# Patient Record
Sex: MALE | Race: WHITE | NOT HISPANIC OR LATINO | URBAN - METROPOLITAN AREA
[De-identification: names, ages, dates, MRNs, and addresses within clinical notes are randomized per-mention and may not be internally consistent; named-entity substitution may affect disease eponyms.]

---

## 2023-06-15 DIAGNOSIS — F41.9 ANXIETY: Primary | ICD-10-CM

## 2023-06-15 RX ORDER — ESCITALOPRAM OXALATE 10 MG/1
TABLET ORAL
COMMUNITY
Start: 2016-03-17 | End: 2023-06-15 | Stop reason: SDUPTHER

## 2023-06-15 RX ORDER — FAMOTIDINE 20 MG/1
1 TABLET, FILM COATED ORAL DAILY
COMMUNITY

## 2023-06-15 RX ORDER — LEVOTHYROXINE SODIUM 112 UG/1
1 TABLET ORAL DAILY
COMMUNITY
Start: 2014-02-06 | End: 2023-07-24 | Stop reason: SDUPTHER

## 2023-06-15 RX ORDER — ESCITALOPRAM OXALATE 10 MG/1
10 TABLET ORAL DAILY
Qty: 90 TABLET | Refills: 0 | Status: SHIPPED | OUTPATIENT
Start: 2023-06-15 | End: 2023-09-11 | Stop reason: SDUPTHER

## 2023-06-15 NOTE — TELEPHONE ENCOUNTER
REFILL REQUEST    Med: Escitalopram Oxalate  Med Dose: 10 mg  Med Frequency: one tab daily    Pharmacy: [facility]    LR: 12/29/2022  LV: 11/07/2022  NV: 11/09/2023

## 2023-06-20 ENCOUNTER — OFFICE VISIT (OUTPATIENT)
Dept: PRIMARY CARE | Facility: CLINIC | Age: 63
End: 2023-06-20
Payer: COMMERCIAL

## 2023-06-20 VITALS
TEMPERATURE: 98.3 F | BODY MASS INDEX: 24.33 KG/M2 | DIASTOLIC BLOOD PRESSURE: 68 MMHG | SYSTOLIC BLOOD PRESSURE: 120 MMHG | WEIGHT: 172 LBS

## 2023-06-20 DIAGNOSIS — M47.816 SPONDYLOSIS OF LUMBAR REGION WITHOUT MYELOPATHY OR RADICULOPATHY: Primary | ICD-10-CM

## 2023-06-20 PROCEDURE — 1036F TOBACCO NON-USER: CPT | Performed by: FAMILY MEDICINE

## 2023-06-20 PROCEDURE — 99213 OFFICE O/P EST LOW 20 MIN: CPT | Performed by: FAMILY MEDICINE

## 2023-06-20 RX ORDER — METHYLPREDNISOLONE 4 MG/1
TABLET ORAL
Qty: 21 TABLET | Refills: 0 | Status: SHIPPED | OUTPATIENT
Start: 2023-06-20 | End: 2023-06-27

## 2023-06-20 RX ORDER — TRAMADOL HYDROCHLORIDE 50 MG/1
50 TABLET ORAL EVERY 6 HOURS PRN
Qty: 15 TABLET | Refills: 0 | Status: SHIPPED | OUTPATIENT
Start: 2023-06-20 | End: 2023-06-25

## 2023-06-20 ASSESSMENT — PATIENT HEALTH QUESTIONNAIRE - PHQ9
2. FEELING DOWN, DEPRESSED OR HOPELESS: NOT AT ALL
SUM OF ALL RESPONSES TO PHQ9 QUESTIONS 1 AND 2: 0
1. LITTLE INTEREST OR PLEASURE IN DOING THINGS: NOT AT ALL

## 2023-06-20 ASSESSMENT — ENCOUNTER SYMPTOMS: DEPRESSION: 0

## 2023-06-20 NOTE — PATIENT INSTRUCTIONS
Continue with hanging (right side up) traction. Continue with the TENS unit. I will order a course of steroids and a short course of tramadol, which is potentially habit forming and addictive.  Return in two weeks if this is not much better.  PT will be a consideration.

## 2023-06-20 NOTE — PROGRESS NOTES
Subjective   Patient ID: [id number]     Asher Bernabe is a 63 y.o. male who presents for Back Pain (Lower back).  HPI  He complains of back pain.      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  He had some pain with that and was hoping it would go away with rest.  It did improve.  The pain came back in March.      He was diagnosed with a retinal tear and was treated with retinal surgery 4/27/23 with Dr Candelaria.  Had two follow ups and everything was good. Has another follow up in October.      He had PT for his back five times in November and it seemed to help.      The pain now is daily.  It started in March or April.  He may have strained it mowing the lawn.  He takes ibuprofen daily.  He hangs from a chin up bar.  He uses a TENS unit that helps. He uses it for a half hour per night.      Used leftover tramadol from years ago four times and that helped him sleep.     He had an xray of the lumbar spine 2012.  It showed L5-S1 DDD.      Checked OARRS.  No scripts in system  Objective     /68 (BP Location: Left arm, Patient Position: Sitting)   Temp 36.8 °C (98.3 °F)   Wt 78 kg (172 lb)   BMI 24.33 kg/m²      Physical Exam  Constitutional:       Appearance: Normal appearance.   Musculoskeletal:         General: Tenderness (tender at the lumbar paraspinals.) present.      Comments: Tender at the L5 midline    Neurological:      Mental Status: He is alert.      Sensory: No sensory deficit.      Comments: SLR neg.  DTR neg.         Assessment/Plan   Problem List Items Addressed This Visit    None  Visit Diagnoses       Spondylosis of lumbar region without myelopathy or radiculopathy    -  Primary    Relevant Medications    traMADol (Ultram) 50 mg tablet    methylPREDNISolone (Medrol Dospak) 4 mg tablets        Continue with hanging (right side up) traction. Continue with the TENS unit. I will order a course of steroids and a short course of tramadol, which is potentially habit forming  and addictive.  Return in two weeks if this is not much better.  PT will be a consideration.    Tyree Box, DO

## 2023-07-24 DIAGNOSIS — E03.9 ACQUIRED HYPOTHYROIDISM: Primary | ICD-10-CM

## 2023-07-24 RX ORDER — LEVOTHYROXINE SODIUM 112 UG/1
112 TABLET ORAL DAILY
Qty: 90 TABLET | Refills: 0 | Status: SHIPPED | OUTPATIENT
Start: 2023-07-24 | End: 2023-09-11 | Stop reason: SDUPTHER

## 2023-07-24 NOTE — TELEPHONE ENCOUNTER
Pt is requesting a refill on    Levothyroxine 112 mg 1 po every day  LR 12/29/2022  # 90 1 [facility]  [phone]    [place]   11/19/2023

## 2023-08-22 ENCOUNTER — OFFICE VISIT (OUTPATIENT)
Dept: PRIMARY CARE | Facility: CLINIC | Age: 63
End: 2023-08-22
Payer: COMMERCIAL

## 2023-08-22 VITALS
SYSTOLIC BLOOD PRESSURE: 122 MMHG | TEMPERATURE: 97.9 F | BODY MASS INDEX: 24.47 KG/M2 | WEIGHT: 173 LBS | DIASTOLIC BLOOD PRESSURE: 70 MMHG

## 2023-08-22 DIAGNOSIS — M54.50 CHRONIC BILATERAL LOW BACK PAIN WITHOUT SCIATICA: Primary | ICD-10-CM

## 2023-08-22 DIAGNOSIS — G89.29 CHRONIC BILATERAL LOW BACK PAIN WITHOUT SCIATICA: Primary | ICD-10-CM

## 2023-08-22 DIAGNOSIS — M47.816 SPONDYLOSIS OF LUMBAR REGION WITHOUT MYELOPATHY OR RADICULOPATHY: ICD-10-CM

## 2023-08-22 DIAGNOSIS — M54.2 NECK PAIN: ICD-10-CM

## 2023-08-22 PROBLEM — J06.9 ACUTE URI: Status: RESOLVED | Noted: 2023-08-22 | Resolved: 2023-08-22

## 2023-08-22 PROBLEM — S80.819A LEG ABRASION: Status: RESOLVED | Noted: 2023-08-22 | Resolved: 2023-08-22

## 2023-08-22 PROBLEM — J20.9 ACUTE BRONCHITIS WITH BRONCHOSPASM: Status: RESOLVED | Noted: 2023-08-22 | Resolved: 2023-08-22

## 2023-08-22 PROBLEM — M25.519 SHOULDER PAIN, ACUTE: Status: ACTIVE | Noted: 2023-08-22

## 2023-08-22 PROBLEM — R09.81 SINUS CONGESTION: Status: RESOLVED | Noted: 2023-08-22 | Resolved: 2023-08-22

## 2023-08-22 PROBLEM — R22.0 LIP SWELLING: Status: RESOLVED | Noted: 2023-08-22 | Resolved: 2023-08-22

## 2023-08-22 PROBLEM — J30.9 ALLERGIC RHINITIS: Status: ACTIVE | Noted: 2023-08-22

## 2023-08-22 PROBLEM — E03.9 HYPOTHYROIDISM: Status: ACTIVE | Noted: 2023-08-22

## 2023-08-22 PROBLEM — J02.9 ACUTE PHARYNGITIS: Status: RESOLVED | Noted: 2023-08-22 | Resolved: 2023-08-22

## 2023-08-22 PROBLEM — F41.9 ANXIETY: Status: ACTIVE | Noted: 2023-08-22

## 2023-08-22 PROBLEM — S46.912A LEFT SHOULDER STRAIN, INITIAL ENCOUNTER: Status: ACTIVE | Noted: 2023-08-22

## 2023-08-22 PROBLEM — E87.5 HIGH SERUM POTASSIUM LEVEL: Status: ACTIVE | Noted: 2023-08-22

## 2023-08-22 PROBLEM — S39.012A STRAIN OF LUMBAR REGION: Status: ACTIVE | Noted: 2023-08-22

## 2023-08-22 PROBLEM — J01.90 ACUTE SINUSITIS: Status: RESOLVED | Noted: 2023-08-22 | Resolved: 2023-08-22

## 2023-08-22 PROCEDURE — 99213 OFFICE O/P EST LOW 20 MIN: CPT | Performed by: FAMILY MEDICINE

## 2023-08-22 PROCEDURE — 1036F TOBACCO NON-USER: CPT | Performed by: FAMILY MEDICINE

## 2023-08-22 RX ORDER — METHYLPREDNISOLONE 4 MG/1
TABLET ORAL
Qty: 21 TABLET | Refills: 0 | Status: SHIPPED | OUTPATIENT
Start: 2023-08-22 | End: 2023-08-29

## 2023-08-22 ASSESSMENT — PATIENT HEALTH QUESTIONNAIRE - PHQ9
SUM OF ALL RESPONSES TO PHQ9 QUESTIONS 1 AND 2: 0
1. LITTLE INTEREST OR PLEASURE IN DOING THINGS: NOT AT ALL
2. FEELING DOWN, DEPRESSED OR HOPELESS: NOT AT ALL

## 2023-08-22 ASSESSMENT — ENCOUNTER SYMPTOMS: DEPRESSION: 0

## 2023-08-22 NOTE — PROGRESS NOTES
"Subjective   Patient ID: [id number]     Asher Bernabe is a 63 y.o. male who presents for Back Pain (lower) and Tingling (In neck.).  HPI  He complains of lower back pain and tingling in the neck.      He went on a bus trip to [facility] in late June.  He then came back and used the TENS unit.      He can be feeling just fine until he bends down to  sticks in the yard.      He gets tingling in the neck if he puts his hand on the right side of the neck.      Goes walking and that helps a lot but then he picks up a few sticks and the pain comes back.  The pain is really bad with picking up sticks.      He watches his grandson twice a week.  He has \"weaned off of picking him up\".        Objective     /70 (BP Location: Right arm, Patient Position: Sitting)   Temp 36.6 °C (97.9 °F)   Wt 78.5 kg (173 lb)   BMI 24.47 kg/m²      Physical Exam  Constitutional:       Appearance: Normal appearance.   Musculoskeletal:      Comments: Tender at the lumbar paraspinals.  Tender at the mid L5 region focally.         Neurological:      Mental Status: He is alert.         Assessment/Plan   Problem List Items Addressed This Visit    None  Visit Diagnoses       Chronic bilateral low back pain without sciatica    -  Primary    Relevant Medications    methylPREDNISolone (Medrol Dospak) 4 mg tablets    Other Relevant Orders    XR lumbar spine 2-3 views    Referral to Physical Therapy    Spondylosis of lumbar region without myelopathy or radiculopathy        Relevant Medications    methylPREDNISolone (Medrol Dospak) 4 mg tablets    Other Relevant Orders    XR lumbar spine 2-3 views    Referral to Physical Therapy    Neck pain        Relevant Medications    methylPREDNISolone (Medrol Dospak) 4 mg tablets    Other Relevant Orders    XR cervical spine 2-3 views    Referral to Physical Therapy        I will order steroids, xrays and physical therapy.  Return in one month if this persists.    Tyree Box, DO   "

## 2023-09-11 DIAGNOSIS — F41.9 ANXIETY: ICD-10-CM

## 2023-09-11 DIAGNOSIS — E03.9 ACQUIRED HYPOTHYROIDISM: ICD-10-CM

## 2023-09-11 RX ORDER — ESCITALOPRAM OXALATE 10 MG/1
10 TABLET ORAL DAILY
Qty: 90 TABLET | Refills: 0 | Status: SHIPPED | OUTPATIENT
Start: 2023-09-11 | End: 2023-11-13 | Stop reason: SDUPTHER

## 2023-09-11 RX ORDER — LEVOTHYROXINE SODIUM 112 UG/1
112 TABLET ORAL DAILY
Qty: 90 TABLET | Refills: 0 | Status: SHIPPED | OUTPATIENT
Start: 2023-09-11 | End: 2023-10-19 | Stop reason: SDUPTHER

## 2023-09-11 NOTE — TELEPHONE ENCOUNTER
Pt requesting refill- Pt is requesting a 90 day supply with 3 refills    Escitalopram 10mg daily- Last refill 6/15/23  Levothyroxine 112mcg daily- Last refill 7/24/23  [facility]  Last appt 8/22/23  Next appt 11/13/23

## 2023-10-18 DIAGNOSIS — E03.9 ACQUIRED HYPOTHYROIDISM: ICD-10-CM

## 2023-10-18 NOTE — TELEPHONE ENCOUNTER
Refill Synthroid 112mcg 1 every day    Pharmacy: [facility]    LR: 09/11/23  LV: 08/22/23  NV: 11/13/23    Can wait

## 2023-10-19 RX ORDER — LEVOTHYROXINE SODIUM 112 UG/1
112 TABLET ORAL DAILY
Qty: 90 TABLET | Refills: 0 | Status: SHIPPED | OUTPATIENT
Start: 2023-10-19 | End: 2023-11-13 | Stop reason: SDUPTHER

## 2023-11-03 ENCOUNTER — TELEMEDICINE (OUTPATIENT)
Dept: PRIMARY CARE | Facility: CLINIC | Age: 63
End: 2023-11-03
Payer: COMMERCIAL

## 2023-11-03 DIAGNOSIS — B34.2 CORONAVIRUS INFECTION: ICD-10-CM

## 2023-11-03 DIAGNOSIS — J01.00 ACUTE NON-RECURRENT MAXILLARY SINUSITIS: Primary | ICD-10-CM

## 2023-11-03 PROCEDURE — 99213 OFFICE O/P EST LOW 20 MIN: CPT | Performed by: FAMILY MEDICINE

## 2023-11-03 RX ORDER — AMOXICILLIN AND CLAVULANATE POTASSIUM 875; 125 MG/1; MG/1
875 TABLET, FILM COATED ORAL 2 TIMES DAILY
Qty: 20 TABLET | Refills: 0 | Status: SHIPPED | OUTPATIENT
Start: 2023-11-03 | End: 2023-11-13

## 2023-11-03 NOTE — PROGRESS NOTES
Subjective   Patient ID: [id number]   Virtual or Telephone Consent    An interactive audio and video telecommunication system which permits real time communications between the patient (at the originating site) and provider (at the distant site) was utilized to provide this telehealth service.   Verbal consent was requested and obtained from Asher Bernabe on this date, 11/03/23 for a telehealth visit.     Asher Bernabe is a 63 y.o. male who presents for covid infection.    He started with sickness on 10/21.  He felt sick but then felt better.  He went back to work on 10/31 because he was feeling better.  He then got sick again and he tested positive today for covid.      He was feeling better for two days but then he got sick again on 11/1/23.      Maybe it was two separate illnesses but he is not sure.      No SOB.  He is fatigued.  Coughing.  Sinus congestion.    HPI    Objective     There were no vitals taken for this visit.     Physical Exam  Constitutional:       Appearance: Normal appearance.   HENT:      Nose:      Comments: Right maxillary tenderness. (Pt points)  Pulmonary:      Effort: Pulmonary effort is normal. No respiratory distress.   Neurological:      General: No focal deficit present.      Mental Status: He is alert.         Assessment/Plan   Problem List Items Addressed This Visit    None  Visit Diagnoses       Acute non-recurrent maxillary sinusitis    -  Primary    Relevant Medications    amoxicillin-pot clavulanate (Augmentin) 875-125 mg tablet    Coronavirus infection              I will prescribe antibiotics.  I recommend that you quarantine for two more days and until you are feeling significantly better.  I prescribed antibiotics for a possible sinus infection.    Tyree Box, DO

## 2023-11-03 NOTE — PATIENT INSTRUCTIONS
I will prescribe antibiotics.  I recommend that you quarantine for two more days and until you are feeling significantly better.  I prescribed antibiotics for a possible sinus infection.

## 2023-11-09 ENCOUNTER — APPOINTMENT (OUTPATIENT)
Dept: PRIMARY CARE | Facility: CLINIC | Age: 63
End: 2023-11-09
Payer: COMMERCIAL

## 2023-11-13 ENCOUNTER — OFFICE VISIT (OUTPATIENT)
Dept: PRIMARY CARE | Facility: CLINIC | Age: 63
End: 2023-11-13
Payer: COMMERCIAL

## 2023-11-13 ENCOUNTER — TELEPHONE (OUTPATIENT)
Dept: PRIMARY CARE | Facility: CLINIC | Age: 63
End: 2023-11-13

## 2023-11-13 VITALS
BODY MASS INDEX: 23.8 KG/M2 | SYSTOLIC BLOOD PRESSURE: 118 MMHG | HEIGHT: 71 IN | TEMPERATURE: 96.7 F | WEIGHT: 170 LBS | DIASTOLIC BLOOD PRESSURE: 70 MMHG

## 2023-11-13 DIAGNOSIS — Z23 NEED FOR VACCINATION: ICD-10-CM

## 2023-11-13 DIAGNOSIS — F41.9 ANXIETY: ICD-10-CM

## 2023-11-13 DIAGNOSIS — E03.9 ACQUIRED HYPOTHYROIDISM: ICD-10-CM

## 2023-11-13 DIAGNOSIS — Z00.00 GENERAL MEDICAL EXAM: Primary | ICD-10-CM

## 2023-11-13 LAB
ALBUMIN SERPL BCP-MCNC: 4.2 G/DL (ref 3.4–5)
ALP SERPL-CCNC: 74 U/L (ref 33–136)
ALT SERPL W P-5'-P-CCNC: 24 U/L (ref 10–52)
ANION GAP SERPL CALC-SCNC: 7 MMOL/L (ref 10–20)
APPEARANCE UR: CLEAR
AST SERPL W P-5'-P-CCNC: 31 U/L (ref 9–39)
BASOPHILS # BLD AUTO: 0.04 X10*3/UL (ref 0–0.1)
BASOPHILS NFR BLD AUTO: 0.8 %
BILIRUB SERPL-MCNC: 1.2 MG/DL (ref 0–1.2)
BILIRUB UR STRIP.AUTO-MCNC: NEGATIVE MG/DL
BUN SERPL-MCNC: 14 MG/DL (ref 6–23)
CALCIUM SERPL-MCNC: 9.4 MG/DL (ref 8.6–10.3)
CHLORIDE SERPL-SCNC: 101 MMOL/L (ref 98–107)
CHOLEST SERPL-MCNC: 174 MG/DL (ref 0–199)
CHOLESTEROL/HDL RATIO: 2.3
CO2 SERPL-SCNC: 34 MMOL/L (ref 21–32)
COLOR UR: YELLOW
CREAT SERPL-MCNC: 0.99 MG/DL (ref 0.5–1.3)
EOSINOPHIL # BLD AUTO: 0.19 X10*3/UL (ref 0–0.7)
EOSINOPHIL NFR BLD AUTO: 3.8 %
ERYTHROCYTE [DISTWIDTH] IN BLOOD BY AUTOMATED COUNT: 13.1 % (ref 11.5–14.5)
EST. AVERAGE GLUCOSE BLD GHB EST-MCNC: 111 MG/DL
GFR SERPL CREATININE-BSD FRML MDRD: 86 ML/MIN/1.73M*2
GLUCOSE SERPL-MCNC: 78 MG/DL (ref 74–99)
GLUCOSE UR STRIP.AUTO-MCNC: NEGATIVE MG/DL
HBA1C MFR BLD: 5.5 %
HCT VFR BLD AUTO: 45.3 % (ref 41–52)
HDLC SERPL-MCNC: 75.4 MG/DL
HGB BLD-MCNC: 14.7 G/DL (ref 13.5–17.5)
IMM GRANULOCYTES # BLD AUTO: 0.01 X10*3/UL (ref 0–0.7)
IMM GRANULOCYTES NFR BLD AUTO: 0.2 % (ref 0–0.9)
KETONES UR STRIP.AUTO-MCNC: NEGATIVE MG/DL
LDLC SERPL CALC-MCNC: 86 MG/DL
LEUKOCYTE ESTERASE UR QL STRIP.AUTO: NEGATIVE
LYMPHOCYTES # BLD AUTO: 1.12 X10*3/UL (ref 1.2–4.8)
LYMPHOCYTES NFR BLD AUTO: 22.2 %
MCH RBC QN AUTO: 32.5 PG (ref 26–34)
MCHC RBC AUTO-ENTMCNC: 32.5 G/DL (ref 32–36)
MCV RBC AUTO: 100 FL (ref 80–100)
MONOCYTES # BLD AUTO: 0.32 X10*3/UL (ref 0.1–1)
MONOCYTES NFR BLD AUTO: 6.3 %
NEUTROPHILS # BLD AUTO: 3.37 X10*3/UL (ref 1.2–7.7)
NEUTROPHILS NFR BLD AUTO: 66.7 %
NITRITE UR QL STRIP.AUTO: NEGATIVE
NON HDL CHOLESTEROL: 99 MG/DL (ref 0–149)
NRBC BLD-RTO: 0 /100 WBCS (ref 0–0)
PH UR STRIP.AUTO: 6 [PH]
PLATELET # BLD AUTO: 298 X10*3/UL (ref 150–450)
POTASSIUM SERPL-SCNC: 4.1 MMOL/L (ref 3.5–5.3)
PROT SERPL-MCNC: 6.9 G/DL (ref 6.4–8.2)
PROT UR STRIP.AUTO-MCNC: NEGATIVE MG/DL
PSA SERPL-MCNC: 0.48 NG/ML
RBC # BLD AUTO: 4.52 X10*6/UL (ref 4.5–5.9)
RBC # UR STRIP.AUTO: ABNORMAL /UL
RBC #/AREA URNS AUTO: NORMAL /HPF
SODIUM SERPL-SCNC: 138 MMOL/L (ref 136–145)
SP GR UR STRIP.AUTO: 1.02
TRIGL SERPL-MCNC: 62 MG/DL (ref 0–149)
TSH SERPL-ACNC: 1.33 MIU/L (ref 0.44–3.98)
UROBILINOGEN UR STRIP.AUTO-MCNC: <2 MG/DL
VLDL: 12 MG/DL (ref 0–40)
WBC # BLD AUTO: 5.1 X10*3/UL (ref 4.4–11.3)
WBC #/AREA URNS AUTO: NORMAL /HPF

## 2023-11-13 PROCEDURE — 85025 COMPLETE CBC W/AUTO DIFF WBC: CPT

## 2023-11-13 PROCEDURE — 81001 URINALYSIS AUTO W/SCOPE: CPT

## 2023-11-13 PROCEDURE — 99396 PREV VISIT EST AGE 40-64: CPT | Performed by: FAMILY MEDICINE

## 2023-11-13 PROCEDURE — 80053 COMPREHEN METABOLIC PANEL: CPT

## 2023-11-13 PROCEDURE — 84153 ASSAY OF PSA TOTAL: CPT

## 2023-11-13 PROCEDURE — 84443 ASSAY THYROID STIM HORMONE: CPT

## 2023-11-13 PROCEDURE — 36415 COLL VENOUS BLD VENIPUNCTURE: CPT

## 2023-11-13 PROCEDURE — 83036 HEMOGLOBIN GLYCOSYLATED A1C: CPT

## 2023-11-13 PROCEDURE — 90471 IMMUNIZATION ADMIN: CPT | Performed by: FAMILY MEDICINE

## 2023-11-13 PROCEDURE — 80061 LIPID PANEL: CPT

## 2023-11-13 PROCEDURE — 1036F TOBACCO NON-USER: CPT | Performed by: FAMILY MEDICINE

## 2023-11-13 PROCEDURE — 90686 IIV4 VACC NO PRSV 0.5 ML IM: CPT | Performed by: FAMILY MEDICINE

## 2023-11-13 RX ORDER — LEVOTHYROXINE SODIUM 112 UG/1
112 TABLET ORAL DAILY
Qty: 90 TABLET | Refills: 3 | Status: SHIPPED | OUTPATIENT
Start: 2023-11-13

## 2023-11-13 RX ORDER — ESCITALOPRAM OXALATE 10 MG/1
10 TABLET ORAL DAILY
Qty: 90 TABLET | Refills: 3 | Status: SHIPPED | OUTPATIENT
Start: 2023-11-13

## 2023-11-13 ASSESSMENT — ENCOUNTER SYMPTOMS
ABDOMINAL PAIN: 0
SHORTNESS OF BREATH: 0
WEAKNESS: 0
SLEEP DISTURBANCE: 0
DEPRESSION: 0
VOMITING: 0
HEADACHES: 0
BLOOD IN STOOL: 0
DYSPHORIC MOOD: 0
NAUSEA: 0
HEMATURIA: 0
PALPITATIONS: 0
SINUS PAIN: 0
COUGH: 0
DIZZINESS: 0
MYALGIAS: 0
DIARRHEA: 0
FATIGUE: 0
VOICE CHANGE: 0
NERVOUS/ANXIOUS: 0
FREQUENCY: 0
ADENOPATHY: 0
SORE THROAT: 0
WHEEZING: 0
NECK PAIN: 0
CHILLS: 0
RHINORRHEA: 0
ARTHRALGIAS: 0
DYSURIA: 0
TROUBLE SWALLOWING: 0
WOUND: 0
CONSTIPATION: 0
FEVER: 0
NUMBNESS: 0
BACK PAIN: 0

## 2023-11-13 ASSESSMENT — PATIENT HEALTH QUESTIONNAIRE - PHQ9
1. LITTLE INTEREST OR PLEASURE IN DOING THINGS: NOT AT ALL
SUM OF ALL RESPONSES TO PHQ9 QUESTIONS 1 AND 2: 0
2. FEELING DOWN, DEPRESSED OR HOPELESS: NOT AT ALL

## 2023-11-13 NOTE — PATIENT INSTRUCTIONS
Your exam was normal today.  Your blood pressure and weight are normal.  Continue to maintain a healthy diet and increase exercise. Return in one year for a general medical exam and sooner if you have any problems.  Your escitalopram and levothyroxine were refilled today.  A flu shot was given today.

## 2023-11-13 NOTE — TELEPHONE ENCOUNTER
Pt was just seen for an appointment today 11-13-23 and forgot to ask about his x-ray results for his neck and back. Pt is asking if someone can give him a call to discuss his results?

## 2023-11-13 NOTE — TELEPHONE ENCOUNTER
Per BH- Please let him know they showed degenerative disc disease and arthritis in the spine. It also showed evidence of muscle spasm. Physical therapy may be indicated if this is continuing to bother him.

## 2023-11-13 NOTE — PROGRESS NOTES
"Subjective   Patient ID: [id number]     Asher Bernabe is a 63 y.o. male who presents for Annual Exam (Fasting), Med Refill, and Flu Vaccine.  HPI  He is here for a general medical examination.    He had a colonoscopy in 2014 which was normal.  He was told to repeat it in five years. He states he had one in 2019 which was normal.  He was told to repeat every five years due to family history in brother and father.    He had a retinal tear treated in April.    No hospitalizations.  No surgeries.    He tested positive for covid on 11/3/23.  He is feeling better now.  Still wearing a mask.    He has had some neck pain.  He had PT which has helped. It is gone over time.    Never a smoker.  Drinks about five drinks per week.  No  drug use.      He used to exercise more and he wants to do more.  Signing up for volleyball.    He maintains a healthy diet.     He sees a dentist regularly.    Review of Systems   Constitutional:  Negative for chills, fatigue and fever.   HENT:  Negative for congestion, rhinorrhea, sinus pain, sore throat, trouble swallowing and voice change.    Respiratory:  Negative for cough, shortness of breath and wheezing.    Cardiovascular:  Negative for chest pain, palpitations and leg swelling.   Gastrointestinal:  Negative for abdominal pain, blood in stool, constipation, diarrhea, nausea and vomiting.   Genitourinary:  Negative for dysuria, frequency and hematuria.   Musculoskeletal:  Negative for arthralgias, back pain, myalgias and neck pain.        Neck pain is better    Skin:  Negative for rash and wound.        No moles growing or changing. \"Same red dots on my chest\".     Neurological:  Negative for dizziness, syncope, weakness, numbness and headaches.   Hematological:  Negative for adenopathy.   Psychiatric/Behavioral:  Negative for dysphoric mood, self-injury, sleep disturbance and suicidal ideas. The patient is not nervous/anxious.         Lexapro helps with mind racing and sleep problems.   " "        Objective     /70 (BP Location: Left arm, Patient Position: Sitting)   Temp 35.9 °C (96.7 °F) (Skin)   Ht 1.791 m (5' 10.5\")   Wt 77.1 kg (170 lb)   BMI 24.05 kg/m²      Physical Exam  Vitals reviewed.   Constitutional:       General: He is not in acute distress.     Appearance: Normal appearance. He is not ill-appearing or toxic-appearing.   HENT:      Head: Normocephalic and atraumatic.      Right Ear: Tympanic membrane, ear canal and external ear normal.      Left Ear: Tympanic membrane, ear canal and external ear normal.      Nose: Nose normal.      Mouth/Throat:      Mouth: Mucous membranes are moist.   Eyes:      Extraocular Movements: Extraocular movements intact.      Conjunctiva/sclera: Conjunctivae normal.      Pupils: Pupils are equal, round, and reactive to light.   Cardiovascular:      Rate and Rhythm: Normal rate and regular rhythm.      Heart sounds: No murmur heard.  Pulmonary:      Effort: Pulmonary effort is normal.      Breath sounds: Normal breath sounds.   Abdominal:      General: Bowel sounds are normal. There is no distension.      Palpations: Abdomen is soft. There is no mass.      Tenderness: There is no abdominal tenderness. There is no guarding or rebound.   Musculoskeletal:         General: No tenderness.      Cervical back: Neck supple.      Right lower leg: No edema.      Left lower leg: No edema.   Skin:     Coloration: Skin is not jaundiced or pale.      Findings: No rash.   Neurological:      General: No focal deficit present.      Mental Status: He is alert and oriented to person, place, and time. Mental status is at baseline.   Psychiatric:         Mood and Affect: Mood normal.         Behavior: Behavior normal.         Thought Content: Thought content normal.         Judgment: Judgment normal.         Assessment/Plan   Problem List Items Addressed This Visit       Anxiety    Relevant Medications    escitalopram (Lexapro) 10 mg tablet    Hypothyroidism    Relevant " Medications    levothyroxine (Synthroid, Levoxyl) 112 mcg tablet     Other Visit Diagnoses       General medical exam    -  Primary    Relevant Orders    Urinalysis with Reflex Microscopic    Thyroid Stimulating Hormone    Hemoglobin A1C    Lipid Panel    CBC and Auto Differential    Comprehensive Metabolic Panel    Prostate Specific Antigen    Need for vaccination            Your exam was normal today.  Your blood pressure and weight are normal.  Continue to maintain a healthy diet and increase exercise. Return in one year for a general medical exam and sooner if you have any problems.  Your escitalopram and levothyroxine were refilled today.  A flu shot was given today.      Tyree Box, DO

## 2024-07-30 ENCOUNTER — APPOINTMENT (OUTPATIENT)
Dept: PRIMARY CARE | Facility: CLINIC | Age: 64
End: 2024-07-30
Payer: COMMERCIAL

## 2024-07-30 VITALS
WEIGHT: 171 LBS | TEMPERATURE: 97.8 F | SYSTOLIC BLOOD PRESSURE: 122 MMHG | BODY MASS INDEX: 24.19 KG/M2 | DIASTOLIC BLOOD PRESSURE: 70 MMHG

## 2024-07-30 DIAGNOSIS — E03.9 HYPOTHYROIDISM, UNSPECIFIED TYPE: Primary | ICD-10-CM

## 2024-07-30 DIAGNOSIS — R53.83 FATIGUE, UNSPECIFIED TYPE: ICD-10-CM

## 2024-07-30 LAB
ALBUMIN SERPL BCP-MCNC: 4.6 G/DL (ref 3.4–5)
ALP SERPL-CCNC: 77 U/L (ref 33–136)
ALT SERPL W P-5'-P-CCNC: 20 U/L (ref 10–52)
ANION GAP SERPL CALC-SCNC: 12 MMOL/L (ref 10–20)
AST SERPL W P-5'-P-CCNC: 27 U/L (ref 9–39)
BASOPHILS # BLD AUTO: 0.05 X10*3/UL (ref 0–0.1)
BASOPHILS NFR BLD AUTO: 0.8 %
BILIRUB SERPL-MCNC: 1.6 MG/DL (ref 0–1.2)
BUN SERPL-MCNC: 13 MG/DL (ref 6–23)
CALCIUM SERPL-MCNC: 9.7 MG/DL (ref 8.6–10.3)
CHLORIDE SERPL-SCNC: 103 MMOL/L (ref 98–107)
CO2 SERPL-SCNC: 30 MMOL/L (ref 21–32)
CREAT SERPL-MCNC: 1.07 MG/DL (ref 0.5–1.3)
EGFRCR SERPLBLD CKD-EPI 2021: 78 ML/MIN/1.73M*2
EOSINOPHIL # BLD AUTO: 0.27 X10*3/UL (ref 0–0.7)
EOSINOPHIL NFR BLD AUTO: 4.2 %
ERYTHROCYTE [DISTWIDTH] IN BLOOD BY AUTOMATED COUNT: 13.3 % (ref 11.5–14.5)
GLUCOSE SERPL-MCNC: 76 MG/DL (ref 74–99)
HCT VFR BLD AUTO: 47.6 % (ref 41–52)
HGB BLD-MCNC: 15.4 G/DL (ref 13.5–17.5)
IMM GRANULOCYTES # BLD AUTO: 0.02 X10*3/UL (ref 0–0.7)
IMM GRANULOCYTES NFR BLD AUTO: 0.3 % (ref 0–0.9)
LYMPHOCYTES # BLD AUTO: 1.61 X10*3/UL (ref 1.2–4.8)
LYMPHOCYTES NFR BLD AUTO: 25.1 %
MCH RBC QN AUTO: 32 PG (ref 26–34)
MCHC RBC AUTO-ENTMCNC: 32.4 G/DL (ref 32–36)
MCV RBC AUTO: 99 FL (ref 80–100)
MONOCYTES # BLD AUTO: 0.5 X10*3/UL (ref 0.1–1)
MONOCYTES NFR BLD AUTO: 7.8 %
NEUTROPHILS # BLD AUTO: 3.97 X10*3/UL (ref 1.2–7.7)
NEUTROPHILS NFR BLD AUTO: 61.8 %
NRBC BLD-RTO: 0 /100 WBCS (ref 0–0)
PLATELET # BLD AUTO: 282 X10*3/UL (ref 150–450)
POTASSIUM SERPL-SCNC: 4.6 MMOL/L (ref 3.5–5.3)
PROT SERPL-MCNC: 7.2 G/DL (ref 6.4–8.2)
RBC # BLD AUTO: 4.82 X10*6/UL (ref 4.5–5.9)
SODIUM SERPL-SCNC: 140 MMOL/L (ref 136–145)
T4 FREE SERPL-MCNC: 0.7 NG/DL (ref 0.61–1.12)
TSH SERPL-ACNC: 2.9 MIU/L (ref 0.44–3.98)
WBC # BLD AUTO: 6.4 X10*3/UL (ref 4.4–11.3)

## 2024-07-30 PROCEDURE — 80053 COMPREHEN METABOLIC PANEL: CPT

## 2024-07-30 PROCEDURE — 85025 COMPLETE CBC W/AUTO DIFF WBC: CPT

## 2024-07-30 PROCEDURE — 84481 FREE ASSAY (FT-3): CPT

## 2024-07-30 PROCEDURE — 84443 ASSAY THYROID STIM HORMONE: CPT

## 2024-07-30 PROCEDURE — 36415 COLL VENOUS BLD VENIPUNCTURE: CPT

## 2024-07-30 PROCEDURE — 99213 OFFICE O/P EST LOW 20 MIN: CPT | Performed by: FAMILY MEDICINE

## 2024-07-30 PROCEDURE — 1036F TOBACCO NON-USER: CPT | Performed by: FAMILY MEDICINE

## 2024-07-30 PROCEDURE — 84439 ASSAY OF FREE THYROXINE: CPT

## 2024-07-30 ASSESSMENT — PATIENT HEALTH QUESTIONNAIRE - PHQ9
1. LITTLE INTEREST OR PLEASURE IN DOING THINGS: NOT AT ALL
2. FEELING DOWN, DEPRESSED OR HOPELESS: NOT AT ALL
SUM OF ALL RESPONSES TO PHQ9 QUESTIONS 1 AND 2: 0

## 2024-07-30 NOTE — PROGRESS NOTES
"Subjective   Patient ID: [id number]     Asher Bernabe is a 64 y.o. male who presents for Follow-up (Requesting labwork.).  HPI  He is here for a recheck on hypothyroidism.    He remains on levothyroxine 112 mcg, famotidine, escitalopram.    He thinks that something is off with his thyroid.  He has been feeling tired. He feels the same as when he was first diagnosed with hypothyroidism ten years ago.    He often does not want to do things.  He doesn't feel like finishing his list of things to do and \"usually I am Go Go Go\".      He still plays volleyball.      He complains of less energy.  He wants to do things but he is fatigued.     No missed doses of the thyroid medication.         Current Outpatient Medications on File Prior to Visit   Medication Sig Dispense Refill    escitalopram (Lexapro) 10 mg tablet Take 1 tablet (10 mg) by mouth once daily. 90 tablet 3    famotidine (Pepcid) 20 mg tablet Take 1 tablet (20 mg) by mouth once daily.      levothyroxine (Synthroid, Levoxyl) 112 mcg tablet Take 1 tablet (112 mcg) by mouth once daily. 90 tablet 3     No current facility-administered medications on file prior to visit.     His last TSH was normal in November 2023 at 1.33.    He has noticed these changes in the last six weeks.    Objective     /70 (BP Location: Left arm, Patient Position: Sitting)   Temp 36.6 °C (97.8 °F) (Skin)   Wt 77.6 kg (171 lb)   BMI 24.19 kg/m²      Physical Exam  Constitutional:       Appearance: Normal appearance.   Neck:      Comments: No goiter.  No thyroid nodules.  Cardiovascular:      Rate and Rhythm: Normal rate and regular rhythm.      Heart sounds: Normal heart sounds.   Pulmonary:      Effort: Pulmonary effort is normal.      Breath sounds: Normal breath sounds.   Lymphadenopathy:      Cervical: No cervical adenopathy.   Neurological:      Mental Status: He is alert.   Psychiatric:         Mood and Affect: Mood normal.         Assessment/Plan   Problem List Items " Addressed This Visit       Hypothyroidism - Primary    Relevant Orders    CBC and Auto Differential    Comprehensive Metabolic Panel    Thyroid Stimulating Hormone    T4, free    T3, free     Other Visit Diagnoses       Fatigue, unspecified type        Relevant Orders    CBC and Auto Differential    Comprehensive Metabolic Panel    Thyroid Stimulating Hormone    T4, free    T3, free          I will order labs to evaluate your thyroid and to look for causes of fatigue.  If the thyroid is off, then your thyroid medication may need to be adjusted.   Tyree Box, DO

## 2024-07-30 NOTE — PATIENT INSTRUCTIONS
I will order labs to evaluate your thyroid and to look for causes of fatigue.  If the thyroid is off, then your thyroid medication may need to be adjusted.

## 2024-07-31 LAB — T3FREE SERPL-MCNC: 2.7 PG/ML (ref 2.3–4.2)

## 2024-10-10 ENCOUNTER — APPOINTMENT (OUTPATIENT)
Dept: PRIMARY CARE | Facility: CLINIC | Age: 64
End: 2024-10-10
Payer: COMMERCIAL

## 2024-10-10 VITALS
DIASTOLIC BLOOD PRESSURE: 72 MMHG | WEIGHT: 181 LBS | BODY MASS INDEX: 25.6 KG/M2 | TEMPERATURE: 97.2 F | SYSTOLIC BLOOD PRESSURE: 104 MMHG

## 2024-10-10 DIAGNOSIS — I65.01 STENOSIS OF RIGHT VERTEBRAL ARTERY: ICD-10-CM

## 2024-10-10 DIAGNOSIS — I48.91 ATRIAL FIBRILLATION, UNSPECIFIED TYPE (MULTI): Primary | ICD-10-CM

## 2024-10-10 DIAGNOSIS — G45.9 TIA (TRANSIENT ISCHEMIC ATTACK): ICD-10-CM

## 2024-10-10 PROCEDURE — 1036F TOBACCO NON-USER: CPT | Performed by: FAMILY MEDICINE

## 2024-10-10 PROCEDURE — 99214 OFFICE O/P EST MOD 30 MIN: CPT | Performed by: FAMILY MEDICINE

## 2024-10-10 RX ORDER — BISOPROLOL FUMARATE 5 MG/1
5 TABLET, FILM COATED ORAL
COMMUNITY
Start: 2024-10-04 | End: 2024-10-10 | Stop reason: SDUPTHER

## 2024-10-10 RX ORDER — ATORVASTATIN CALCIUM 40 MG/1
1 TABLET, FILM COATED ORAL NIGHTLY
COMMUNITY
Start: 2024-10-03 | End: 2024-10-10 | Stop reason: SDUPTHER

## 2024-10-10 RX ORDER — ATORVASTATIN CALCIUM 40 MG/1
40 TABLET, FILM COATED ORAL NIGHTLY
Qty: 90 TABLET | Refills: 1 | Status: SHIPPED | OUTPATIENT
Start: 2024-10-10 | End: 2025-04-08

## 2024-10-10 RX ORDER — BISOPROLOL FUMARATE 5 MG/1
5 TABLET, FILM COATED ORAL
Qty: 90 TABLET | Refills: 1 | Status: SHIPPED | OUTPATIENT
Start: 2024-10-10 | End: 2025-04-08

## 2024-10-10 NOTE — PROGRESS NOTES
Subjective   Patient ID: [id number]     Asher Bernabe is a 64 y.o. male who presents for Hospital Follow-up (A-Fib and TIA.).  HPI    He is here for a post hospitalization recheck.     He had atrial fibrillation and a TIA.      He was hospitalized at [facility] from 10/1/24 to 10/3/24.  The DC summary from 10/3/24 was reviewed today.      He went to the ER on 9/16/24 with dizziness that lasted ten minutes then resolve on its own.  On 9/20/24 he experienced blurry vision while driving his car in the right visual field of both eyes.  That lasted five minutes and resolved.  He was seen by his eye doctor and had a normal retinal exam.  The eye doctor noted that he had uvular deviation and sent him to the ED for further evaluation.  He has had right arm tremor and difficulty with balance on the right leg.      He was seen by neurology.  He had a CTA that showed high grade stenosis of the right vertebral artery and focal dense atherosclerotic plaque at the left vertebral artery.  He had a right dominant vertebral artery circulation with a likely congenitally diminutive left vertebral artery.   MRI brain normal.  No signs of stroke were seen.  Echo was done and the report is not yet in the chart.  He was initially placed on aspirin and Plavix  but this was changed to Eliquis by EPS.  He had a cardiology and EPS consult for new onset a fib and started on Eliquis and bisoprolol.        No bleeding anywhere. No chest pain.  Has had some fatigue for about two months.          Objective     /72 (BP Location: Left arm, Patient Position: Sitting)   Temp 36.2 °C (97.2 °F) (Skin)   Wt 82.1 kg (181 lb)   BMI 25.60 kg/m²      Physical Exam  Constitutional:       Appearance: Normal appearance.   Cardiovascular:      Rate and Rhythm: Tachycardia present. Rhythm irregular.      Heart sounds: Normal heart sounds. No murmur heard.  Pulmonary:      Effort: Pulmonary effort is normal. No respiratory distress.      Breath sounds:  Normal breath sounds.   Neurological:      General: No focal deficit present.      Mental Status: He is alert and oriented to person, place, and time.         Assessment/Plan   Problem List Items Addressed This Visit    None  Visit Diagnoses       Atrial fibrillation, unspecified type (Multi)    -  Primary    Relevant Medications    bisoprolol (Zebeta) 5 mg tablet    apixaban (Eliquis) 5 mg tablet    Other Relevant Orders    Referral to Cardiac Electrophysiology    TIA (transient ischemic attack)        Relevant Medications    atorvastatin (Lipitor) 40 mg tablet    apixaban (Eliquis) 5 mg tablet    Other Relevant Orders    Referral to Neurology    Stenosis of right vertebral artery        Relevant Medications    atorvastatin (Lipitor) 40 mg tablet    apixaban (Eliquis) 5 mg tablet    Other Relevant Orders    Referral to Neurology          I will refill you to an electrophysiologist and a neurologist.  I will order refills of your bisoprolol, Eliquis and atorvastatin.  Return in one month for  a recheck.  Return sooner if anything worsens.   Tyree Box, DO

## 2024-10-10 NOTE — PATIENT INSTRUCTIONS
I will refill you to an electrophysiologist and a neurologist.  I will order refills of your bisoprolol, Eliquis and atorvastatin.  Return in one month for  a recheck.  Return sooner if anything worsens.

## 2024-10-31 PROBLEM — I48.0 PAROXYSMAL ATRIAL FIBRILLATION (MULTI): Chronic | Status: ACTIVE | Noted: 2024-10-31

## 2024-10-31 PROBLEM — Z79.899 HIGH RISK MEDICATION USE: Status: ACTIVE | Noted: 2024-10-31

## 2024-11-01 ENCOUNTER — ANCILLARY PROCEDURE (OUTPATIENT)
Dept: CARDIOLOGY | Facility: CLINIC | Age: 64
End: 2024-11-01
Payer: COMMERCIAL

## 2024-11-01 ENCOUNTER — HOSPITAL ENCOUNTER (OUTPATIENT)
Facility: HOSPITAL | Age: 64
Setting detail: OUTPATIENT SURGERY
End: 2024-11-01
Attending: INTERNAL MEDICINE | Admitting: INTERNAL MEDICINE
Payer: COMMERCIAL

## 2024-11-01 ENCOUNTER — OFFICE VISIT (OUTPATIENT)
Dept: CARDIOLOGY | Facility: CLINIC | Age: 64
End: 2024-11-01
Payer: COMMERCIAL

## 2024-11-01 VITALS
DIASTOLIC BLOOD PRESSURE: 64 MMHG | HEIGHT: 71 IN | SYSTOLIC BLOOD PRESSURE: 110 MMHG | HEART RATE: 71 BPM | BODY MASS INDEX: 25.2 KG/M2 | WEIGHT: 180 LBS | OXYGEN SATURATION: 99 %

## 2024-11-01 DIAGNOSIS — Z79.899 HIGH RISK MEDICATION USE: Primary | ICD-10-CM

## 2024-11-01 DIAGNOSIS — I48.3 TYPICAL ATRIAL FLUTTER (MULTI): Chronic | ICD-10-CM

## 2024-11-01 DIAGNOSIS — I48.0 PAROXYSMAL ATRIAL FIBRILLATION (MULTI): Chronic | ICD-10-CM

## 2024-11-01 DIAGNOSIS — I48.91 ATRIAL FIBRILLATION, UNSPECIFIED TYPE (MULTI): ICD-10-CM

## 2024-11-01 DIAGNOSIS — I48.0 PAROXYSMAL ATRIAL FIBRILLATION (MULTI): ICD-10-CM

## 2024-11-01 PROBLEM — I48.92 ATRIAL FLUTTER (MULTI): Chronic | Status: ACTIVE | Noted: 2024-11-01

## 2024-11-01 PROBLEM — G45.9 TIA (TRANSIENT ISCHEMIC ATTACK): Status: ACTIVE | Noted: 2024-10-03

## 2024-11-01 PROBLEM — I65.01 STENOSIS OF RIGHT VERTEBRAL ARTERY: Status: ACTIVE | Noted: 2024-10-01

## 2024-11-01 PROBLEM — M54.9 BACK PAIN: Status: ACTIVE | Noted: 2024-11-01

## 2024-11-01 LAB
ATRIAL RATE: 288 BPM
BODY SURFACE AREA: 2.02 M2
P AXIS: 266 DEGREES
Q ONSET: 226 MS
QRS COUNT: 16 BEATS
QRS DURATION: 72 MS
QT INTERVAL: 342 MS
QTC CALCULATION(BAZETT): 432 MS
QTC FREDERICIA: 400 MS
R AXIS: 76 DEGREES
T AXIS: 78 DEGREES
T OFFSET: 397 MS
VENTRICULAR RATE: 96 BPM

## 2024-11-01 PROCEDURE — 93005 ELECTROCARDIOGRAM TRACING: CPT | Performed by: INTERNAL MEDICINE

## 2024-11-01 PROCEDURE — 93242 EXT ECG>48HR<7D RECORDING: CPT

## 2024-11-01 PROCEDURE — 1036F TOBACCO NON-USER: CPT | Performed by: INTERNAL MEDICINE

## 2024-11-01 PROCEDURE — 99205 OFFICE O/P NEW HI 60 MIN: CPT | Performed by: INTERNAL MEDICINE

## 2024-11-01 PROCEDURE — 99215 OFFICE O/P EST HI 40 MIN: CPT | Performed by: INTERNAL MEDICINE

## 2024-11-01 PROCEDURE — 3008F BODY MASS INDEX DOCD: CPT | Performed by: INTERNAL MEDICINE

## 2024-11-07 ENCOUNTER — APPOINTMENT (OUTPATIENT)
Dept: PRIMARY CARE | Facility: CLINIC | Age: 64
End: 2024-11-07
Payer: COMMERCIAL

## 2024-11-07 ENCOUNTER — OFFICE VISIT (OUTPATIENT)
Dept: NEUROLOGY | Facility: CLINIC | Age: 64
End: 2024-11-07
Payer: COMMERCIAL

## 2024-11-07 VITALS
SYSTOLIC BLOOD PRESSURE: 102 MMHG | RESPIRATION RATE: 18 BRPM | WEIGHT: 180 LBS | HEART RATE: 55 BPM | DIASTOLIC BLOOD PRESSURE: 68 MMHG | BODY MASS INDEX: 25.2 KG/M2 | TEMPERATURE: 96.7 F | HEIGHT: 71 IN

## 2024-11-07 DIAGNOSIS — R53.83 FATIGUE, UNSPECIFIED TYPE: Primary | ICD-10-CM

## 2024-11-07 DIAGNOSIS — I65.01 STENOSIS OF RIGHT VERTEBRAL ARTERY: ICD-10-CM

## 2024-11-07 DIAGNOSIS — G45.9 TIA (TRANSIENT ISCHEMIC ATTACK): ICD-10-CM

## 2024-11-07 PROCEDURE — 1036F TOBACCO NON-USER: CPT | Performed by: STUDENT IN AN ORGANIZED HEALTH CARE EDUCATION/TRAINING PROGRAM

## 2024-11-07 PROCEDURE — 3008F BODY MASS INDEX DOCD: CPT | Performed by: STUDENT IN AN ORGANIZED HEALTH CARE EDUCATION/TRAINING PROGRAM

## 2024-11-07 PROCEDURE — 99205 OFFICE O/P NEW HI 60 MIN: CPT | Performed by: STUDENT IN AN ORGANIZED HEALTH CARE EDUCATION/TRAINING PROGRAM

## 2024-11-07 ASSESSMENT — PAIN SCALES - GENERAL: PAINLEVEL_OUTOF10: 0-NO PAIN

## 2024-11-07 ASSESSMENT — ENCOUNTER SYMPTOMS
DEPRESSION: 0
LOSS OF SENSATION IN FEET: 0
OCCASIONAL FEELINGS OF UNSTEADINESS: 0

## 2024-11-07 NOTE — PATIENT INSTRUCTIONS
"It was a pleasure meeting you today. You were evaluated for your TIA event.        Goals for STROKE PREVENTION- recommendations to reduce the risk of vascular events and promote brain health include monitoring and management of vascular risk factors and lifestyle choices to goal values.       Cardiovascular disease- Goal is monitoring and management of conditions that increase stroke risk as below.    Antithrombotic \"blood thinner\" medication- Antithrombic Therapy/Blood Thinners : Recommended to prevent a stroke or other vascular event  Blood pressure-  Blood Pressure : Goal is less than 130/80 mmHg  Cholesterol- Ideal lipid profile is an LDL-cholesterol <70 mg/dl, total cholesterol <200 mg/dl, fasting triglycerides < 150 mg/dl and HDL-cholesterol >55 mg/dl.   Blood sugar- Blood Sugar : Goal is normal fasting sugar between  mg/dl. Goal is fasting sugar  mg/dl, after eating less than 180 mg/dl; HbA1c less than 7%  Healthy physical activity- Goal is a moderate level of exercise at least 30 minutes/day, most days of the week.   Healthy weight- Goal is an ideal weight with a waistline <40\" for men or <35\" for women, and BMI of 18.5-25.   Healthy diet- is rich in vegetables, fruits, whole grains, legumes and fish, low in salt, and avoids red meats and processed/ refined foods.   Healthy sleep- is restorative, ~7 hours/night, with identification and treatment of obstructive/ central sleep apnea that increases the risk of stroke and heart disease.   Smoking- Goal is to stop smoking any tobacco product and avoid second-hand smoke. For help to quit all forms of tobacco, call 5-924-QUIT-NOW (1-655.392.9379) to speak with a specialist at the [place] [facility].    Alcohol- Goal is moderation; no more than 2 servings for men and 1 serving for non-pregnant women.   Drug use- Goal is avoidance of illicit drugs that can cause blood pressure spikes and damage to blood vessels.   Stroke Warning Signs- know the symptoms of " stroke and the importance of calling 911/EMS to access the quickest treatment.         Thank you for choosing the [facility] for your healthcare.    It was a pleasure to see you in clinic today and be able to participate in your care. I hope your questions were fully answered but if there are questions or concerns in the future, please feel to call [phone] or leave [facility] messages. Please allow 24-28 hours to return your call. Please be ware of stroke warning symptoms and call 911 with any of acute stroke symptoms.

## 2024-11-07 NOTE — PROGRESS NOTES
[facility]   Asher Bernabe is a 64 y.o. year old male presenting for neurologic evaluation.   Referred by: Tyree Box DO  PCP: Tyree Box DO        10/1/2024-10/3/2024: presented with episodic dizziness and blurry vision, noted L uvular deviation by ophthalmologist and came to ED. Neuro exam unremarkable, CTH negative, CTA R vert origin stenosis. MRI reportedly negative, but found with afib during admission, discharged on DOAC.     11/7/2024: presents to stroke prevention clinic for above admission. Pt clarifies that pt had event on 9/16th, with brief dizziness. He had another event on 9/17th with R sided vision blurriness. On 9/20th he had another dizziness event. (12/12 addendum for correction: pt had dizziness on 9/17th and 9/20th R sided blurry vision). Subsequently he saw his regular eye doctor, who noted uvular deviation and sent pt for ED evaluation. MRI was done about 2 weeks from initial event.     Pt reports feeling well, except he has chronic fatigue, which in fact started around July.   Around July he also has been noticing subtle signs such as difficulty balancing on R foot (but not L foot), numbness of hands but often triggered by repetitive movements. Otherwise no episodes of weakness, speech disturbance, falls.    He does report snoring at night and morning fatigue. He does attribute it to getting up earlier than his usual times to take care of his grandson, which he has been doing for the past 2 years.     Review of imaging, echocardiogram, labs and other data:   MRI: reportedly negative for acute infarct (no images available)   Vessel imaging: reportedly only R vert origin stenosis.   Echo:            Ejection Fraction: 64%           LA size: normal           Bubble study: negative   LDL: 86   A1c: 5.8  pAfib found during admission -- converted to sinus spontaneously   Followed up with Dr. Ramicone, noted Aflutter (prior afib and  "flutter), plan for holter monitor and cardioversion.     Relevant ROS, Problem list, Past Medical/ Surgical/ Family/ Social history- reviewed and pertinent details noted in history.     Objective     Visit Vitals  /68 (BP Location: Right arm, Patient Position: Sitting, BP Cuff Size: Adult)   Pulse 55   Temp 35.9 °C (96.7 °F) (Temporal)   Resp 18   Ht 1.791 m (5' 10.5\")   Wt 81.6 kg (180 lb)   BMI 25.46 kg/m²   Smoking Status Never   BSA 2.01 m²       MENTAL STATUS:  General appearance: in NAD  Orientation: [place] to self, time, place and condition   Language: Expression, repetition, naming, comprehension intact.  Concentration: Intact  Fund of knowledge: Appropriate    CRANIAL NERVES:  - Fundoscopic exam: Deferred   - II/III: PERRL  - II:  Visual fields intact to confrontation bilaterally   - III, IV, VI: EOMI to pursuit without nystagmus  - V: V1-V3 sensation intact bilaterally  - VII: Face muscles symmetric with smile and eye closure  - VIII: Intact to finger rub  - IX, X: Uvula is deviated to the left, but palate elevated symmetrically bilaterally, no hoarseness  - XI: 5/5 strength on shoulder shrugging bilaterally  - XII: Tongue midline without atrophy or fasciculation    MOTOR: Tone and bulk normal in all extremities    STRENGTH: R L  Deltoid   5 5  Elbow flex   5 5  Elbox ext   5 5  Hand     5 5  Finger abd  5 5  Finger ext 5 5  Hip flexion  5 5  Knee ext 5 5  Knee flexion 5 5  DorsiFlex  5 5  PlantarFlex  5 5    REFLEXES: R L  Biceps   +3 +2  Triceps   +3 +2  Brachioradialis +3 +2  Patellar   +3 +2  Achilles   +2 +2  Plantar   Down Down  No clonus, frontal release signs or other pathologic reflexes present.     COORDINATION: Intact on finger to nose bl, intact on heel to shin bl, LILI intact bl  SENSORY: Intact to light touch in BUE and BLE  GAIT: Normal stance, cautious walking but with narrow base. Able to perform Toe, heel walk but difficulty with tandem walk       CT head wo IV contrast    Result " Date: 10/1/2024  IMPRESSION: Right dominant vertebral artery with high-grade stenosis at the origin. Focal dense atherosclerotic plaque and significant narrowing of left vertebral artery V4 segment. Findings may be seen with vertebrobasilar syndrome. Otherwise, no acute intracranial process. Arterial blood flow was measured to detect acute large vessel occlusion by computer aided detection software: None. Concordance between software and imaging review: Concordant. : [facility]   Transcribe Date/Time: Oct  1 2024 11:02A Dictated by : ABDIAS NEVAREZ MD This examination was interpreted and the report reviewed and electronically signed by: AMILCAR RIVAS MD on Oct  1 2024 11:22AM  EST   MR brain wo IV contrast    Result Date: 10/2/2024  IMPRESSION: No acute intracranial pathology. : [facility]   Transcribe Date/Time: Oct  2 2024  7:42P Dictated by : WILFRID YOUSSEF MD This examination was interpreted and the report reviewed and electronically signed by: WILFRID YOUSSEF MD on Oct  2 2024  7:55PM  EST   Encounter Date: 11/01/24   ECG 12 lead (Clinic Performed)   Result Value    Ventricular Rate 96    Atrial Rate 288    QRS Duration 72    QT Interval 342    QTC Calculation(Bazett) 432    P Axis 266    R Axis 76    T Axis 78    QRS Count 16    Q Onset 226    T Offset 397    QTC Fredericia 400     No echocardiogram results found for the past 12 months     Lab Results   Component Value Date    CHOL 174 11/13/2023    TRIG 62 11/13/2023    HDL 75.4 11/13/2023    CHHDL 2.3 11/13/2023    VLDL 12 11/13/2023    NHDL 99 11/13/2023     Lab Results   Component Value Date    HGBA1C 5.8 (H) 10/02/2024    HGBA1C 5.5 11/13/2023     Lab Results   Component Value Date    GLUCOSE 76 07/30/2024     07/30/2024    K 4.6 07/30/2024     07/30/2024    CO2 30 07/30/2024    ANIONGAP 12 07/30/2024    BUN 13 07/30/2024    CREATININE 1.07 07/30/2024    GFRMALE 85 11/07/2022    CALCIUM 9.7 07/30/2024    ALBUMIN 4.6  "07/30/2024     Lab Results   Component Value Date    CALCIUM 9.7 07/30/2024    PROT 7.2 07/30/2024    ALBUMIN 4.6 07/30/2024    AST 27 07/30/2024    ALT 20 07/30/2024    ALKPHOS 77 07/30/2024    BILITOT 1.6 (H) 07/30/2024     Lab Results   Component Value Date    WBC 6.4 07/30/2024    HGB 15.4 07/30/2024    HCT 47.6 07/30/2024    MCV 99 07/30/2024     07/30/2024   No results found for: \"INR\"  Lab Results   Component Value Date    TSH 2.90 07/30/2024       Assessment/Plan   1. Fatigue, unspecified type    2. TIA (transient ischemic attack)    3. Stenosis of right vertebral artery        PLAN   Transient dizziness and visual disturbance, suspected TIA, etiology cardioembolic most likely. Pt is found with Afib/flutter, on eliquis for it. Reportedly MRI was negative but this was done about 10-14 days post initial event. Will request MRI images to be pushed over. Meanwhile, continue Eliquis. Pt has completed 5d holter monitor, following Dr. Ramicone (EP) for possible cardioversion vs ablation.  Vascular risk factors: LDL 86, no known YURI but +snoring and daytime fatigue, will obtain home sleep study.       Goals for STROKE PREVENTION- recommendations to reduce the risk of vascular events and promote brain health include monitoring and management of vascular risk factors and lifestyle choices to goal values.     Cardiovascular disease- Goal is monitoring and management of conditions that increase stroke risk.   Antithrombotic \"blood thinner\" medication- Antithrombic Therapy/Blood Thinners : Recommended to prevent a stroke or other vascular event Eliquis 5mg BID   Blood pressure- Normal BP is <120/80 mmHg.  Blood Pressure : Goal is less than 130/80 mmHg  Cholesterol- Ideal lipid profile is an LDL-cholesterol <70 mg/dl, total cholesterol <200 mg/dl, fasting triglycerides < 150 mg/dl and HDL-cholesterol >55 mg/dl.   Blood sugar- Blood Sugar : Goal is fasting sugar  mg/dl, after eating less than 180 mg/dl; HbA1c " "less than 7%  Healthy physical activity- Goal is a moderate level of exercise at least 30 minutes/day, most days of the week.   Healthy weight- Goal is an ideal weight with a waistline <40\" for men or <35\" for women, and BMI of 18.5-25.   Healthy diet- is rich in vegetables, fruits, whole grains, legumes and fish, low in salt, and avoids red meats and processed/ refined foods.   Healthy sleep- is restorative, ~7 hours/night, with identification and treatment of obstructive/ central sleep apnea that increases the risk of stroke and heart disease.   Smoking- Goal is to stop smoking any tobacco product and avoid second-hand smoke. For help to quit all forms of tobacco, call 0-546-QUIT-NOW (1-304.127.7862) to speak with a specialist at the [place] [facility].    Alcohol- Goal is moderation; no more than 2 servings for men and 1 serving for non-pregnant women.   Drug use- Goal is avoidance of illicit drugs that can cause blood pressure spikes and damage to blood vessels.   Stroke Warning Signs- know the symptoms of stroke and the importance of calling 911/EMS to access the quickest treatment.     Orders Placed This Encounter   Procedures    Home sleep apnea test (HSAT)       The total appointment time today was 68 minutes. This time included personally preparing to see the patient, obtaining the history, performing a medically necessary appropriate physical examination, counseling and educating the patient/family, independently interpreting results to the patient/family and documenting clinical information in the medical record.        11/14 addendum: MRI was reviewed -- negative for any significant FLAIR changes in the posterior circulation area. CTA of the head and neck was also reviewed, L vert is clearly nondominant. Etiology most likely cardioembolic.         "

## 2024-11-14 ENCOUNTER — APPOINTMENT (OUTPATIENT)
Dept: PRIMARY CARE | Facility: CLINIC | Age: 64
End: 2024-11-14
Payer: COMMERCIAL

## 2024-11-14 ENCOUNTER — TELEPHONE (OUTPATIENT)
Dept: NEUROLOGY | Facility: CLINIC | Age: 64
End: 2024-11-14

## 2024-11-14 VITALS
WEIGHT: 180 LBS | SYSTOLIC BLOOD PRESSURE: 102 MMHG | TEMPERATURE: 96.6 F | DIASTOLIC BLOOD PRESSURE: 60 MMHG | BODY MASS INDEX: 25.46 KG/M2

## 2024-11-14 DIAGNOSIS — R53.83 FATIGUE, UNSPECIFIED TYPE: ICD-10-CM

## 2024-11-14 DIAGNOSIS — G45.9 TIA (TRANSIENT ISCHEMIC ATTACK): ICD-10-CM

## 2024-11-14 DIAGNOSIS — F41.9 ANXIETY: ICD-10-CM

## 2024-11-14 DIAGNOSIS — I48.91 ATRIAL FIBRILLATION, UNSPECIFIED TYPE (MULTI): Primary | ICD-10-CM

## 2024-11-14 DIAGNOSIS — I65.01 STENOSIS OF RIGHT VERTEBRAL ARTERY: ICD-10-CM

## 2024-11-14 DIAGNOSIS — E03.9 HYPOTHYROIDISM, UNSPECIFIED TYPE: ICD-10-CM

## 2024-11-14 DIAGNOSIS — E03.9 ACQUIRED HYPOTHYROIDISM: ICD-10-CM

## 2024-11-14 PROCEDURE — 99214 OFFICE O/P EST MOD 30 MIN: CPT | Performed by: FAMILY MEDICINE

## 2024-11-14 PROCEDURE — 1036F TOBACCO NON-USER: CPT | Performed by: FAMILY MEDICINE

## 2024-11-14 RX ORDER — ESCITALOPRAM OXALATE 10 MG/1
10 TABLET ORAL DAILY
Qty: 90 TABLET | Refills: 3 | Status: SHIPPED | OUTPATIENT
Start: 2024-11-14

## 2024-11-14 RX ORDER — LEVOTHYROXINE SODIUM 112 UG/1
112 TABLET ORAL DAILY
Qty: 90 TABLET | Refills: 3 | Status: SHIPPED | OUTPATIENT
Start: 2024-11-14

## 2024-11-14 ASSESSMENT — PATIENT HEALTH QUESTIONNAIRE - PHQ9
2. FEELING DOWN, DEPRESSED OR HOPELESS: NOT AT ALL
1. LITTLE INTEREST OR PLEASURE IN DOING THINGS: NOT AT ALL
SUM OF ALL RESPONSES TO PHQ9 QUESTIONS 1 AND 2: 0

## 2024-11-14 NOTE — PROGRESS NOTES
Subjective   Patient ID: [id number]     Asher Bernabe is a 64 y.o. male who presents for Follow-up.  HPI  He is here for a recheck.      He saw EPS and neurology.  The reports from Dr Prieto and Dr Ramicone were reviewed today.      He has recent onset a fib and hx TIA.  He is scheduled for a cardioversion.      Her remains on atorvastatin, Eliquis, bisoprolol, famotidine, levothyroxine, and escitalopram.      He was found to have stenosis of the vertebral artery.  Had a TIA causing vision abnormalities, uvular deviation and difficulty balancing on the right leg.    He had a cardiac monitor done.  He has a cardioversion scheduled on 11/26/24.   This may be canceled if indicated by the monitor results which are not back yet.     Dr Prieto recommended a sleep study and this is set up for a home sleep study soon.      He states that both doctors said he could maintain his normal activities.  But he feels more fatigue lately.      No vision problem.  No stroke like symtpoms.  Has chronic balance issues with the right leg.     No blood loss anywhere.     Current Outpatient Medications on File Prior to Visit   Medication Sig Dispense Refill    apixaban (Eliquis) 5 mg tablet Take 1 tablet (5 mg) by mouth 2 times a day. 180 tablet 1    atorvastatin (Lipitor) 40 mg tablet Take 1 tablet (40 mg) by mouth once daily at bedtime. 90 tablet 1    bisoprolol (Zebeta) 5 mg tablet Take 1 tablet (5 mg) by mouth once daily. 90 tablet 1    escitalopram (Lexapro) 10 mg tablet Take 1 tablet (10 mg) by mouth once daily. 90 tablet 3    famotidine (Pepcid) 20 mg tablet Take 1 tablet (20 mg) by mouth once daily.      levothyroxine (Synthroid, Levoxyl) 112 mcg tablet Take 1 tablet (112 mcg) by mouth once daily. 90 tablet 3     No current facility-administered medications on file prior to visit.     His main complaint is fatigue and tiredness.     Objective     /60 (BP Location: Left arm, Patient Position: Sitting)   Temp 35.9 °C (96.6 °F)  (Skin)   Wt 81.6 kg (180 lb)   BMI 25.46 kg/m²      Physical Exam  Constitutional:       Appearance: Normal appearance.   Cardiovascular:      Rate and Rhythm: Normal rate and regular rhythm.      Heart sounds: Normal heart sounds. No murmur heard.     Comments: No a fib at present.  Pulmonary:      Effort: Pulmonary effort is normal. No respiratory distress.      Breath sounds: Normal breath sounds.   Neurological:      General: No focal deficit present.      Mental Status: He is alert and oriented to person, place, and time.      Motor: Weakness (struggles with toe walking on the right.) present.         Assessment/Plan   Problem List Items Addressed This Visit       Hypothyroidism    Stenosis of right vertebral artery    TIA (transient ischemic attack)     Other Visit Diagnoses       Atrial fibrillation, unspecified type (Multi)    -  Primary    Fatigue, unspecified type              Continue your present medication.  Follow up with neruo and EPS as scheduled.  Follow up with the sleep study.  PT was offered for your heel drop with toe walking on your right leg.  You will work with weights at home.  Return in one month.  Continue the same medication.    Tyree Box, DO

## 2024-11-14 NOTE — TELEPHONE ENCOUNTER
Patient calling to see if you received the whole record of his mri report. Did you see anything worth mentioning? He mentioned you'd call once it was received to let him know.

## 2024-11-14 NOTE — PATIENT INSTRUCTIONS
Continue your present medication.  Follow up with neruo and EPS as scheduled.  Follow up with the sleep study.  PT was offered for your heel drop with toe walking on your right leg.  You will work with weights at home.  Return in one month.  Continue the same medication.

## 2024-11-17 LAB
ATRIAL RATE: 288 BPM
P AXIS: 266 DEGREES
Q ONSET: 226 MS
QRS COUNT: 16 BEATS
QRS DURATION: 72 MS
QT INTERVAL: 342 MS
QTC CALCULATION(BAZETT): 432 MS
QTC FREDERICIA: 400 MS
R AXIS: 76 DEGREES
T AXIS: 78 DEGREES
T OFFSET: 397 MS
VENTRICULAR RATE: 96 BPM

## 2024-11-18 LAB — BODY SURFACE AREA: 2.02 M2

## 2024-12-04 ENCOUNTER — TELEPHONE (OUTPATIENT)
Dept: CARDIOLOGY | Facility: CLINIC | Age: 64
End: 2024-12-04
Payer: COMMERCIAL

## 2024-12-04 ENCOUNTER — APPOINTMENT (OUTPATIENT)
Dept: SLEEP MEDICINE | Facility: CLINIC | Age: 64
End: 2024-12-04
Payer: COMMERCIAL

## 2024-12-04 DIAGNOSIS — I48.91 ATRIAL FIBRILLATION, UNSPECIFIED TYPE (MULTI): ICD-10-CM

## 2024-12-04 NOTE — TELEPHONE ENCOUNTER
Pt is asking if he can cut back on medications. He was recently scheduled for dccv, Dr Ramicone canceled because his monitor showed that the afib is paroxysmal and didn't believe it would be helpful. Pt ended up converting to NSR on his own. He forgot to take his bisoprolol one day and actually felt better than when he takes it. Pt's medications make him feel tired. He would like to cut back on meds to see if it would improve his energy level.    Discussed with Dr Ramicone, ok to decrease bisoprolol dose to 2.5mg one daily (take half of 5mg tablet). Called pt on 12/3 and left a detailed VM. Updated med list.

## 2024-12-05 ENCOUNTER — CLINICAL SUPPORT (OUTPATIENT)
Dept: SLEEP MEDICINE | Facility: CLINIC | Age: 64
End: 2024-12-05
Payer: COMMERCIAL

## 2024-12-05 DIAGNOSIS — G45.9 TIA (TRANSIENT ISCHEMIC ATTACK): ICD-10-CM

## 2024-12-05 DIAGNOSIS — R53.83 FATIGUE, UNSPECIFIED TYPE: ICD-10-CM

## 2024-12-05 RX ORDER — BISOPROLOL FUMARATE 5 MG/1
2.5 TABLET, FILM COATED ORAL DAILY
COMMUNITY
Start: 2024-12-04

## 2024-12-05 NOTE — PROGRESS NOTES
Type of Study: HOME SLEEP STUDY - NOMAD     The patient received equipment and instructions for use of the [employer] Nomad HSAT device. The patient was instructed how to apply the effort belts, cannula, thermistor. It was also explained how the Nomad and oximeter components work.  The patient was asked to record their sleep for an 8-hour period.     The patient was informed of their responsibility for the device and acknowledged this by signing the HSAT device contract. The patient was asked to return the device on 12/6/2024 between the hours of 06:30 am - 6:30 pm to the [facility].     The patient was instructed to call 911 as usual for any medical- emergencies while at home.  The patient was also given a phone number for troubleshooting when using the device in case there were additional questions.

## 2024-12-10 ENCOUNTER — APPOINTMENT (OUTPATIENT)
Dept: PRIMARY CARE | Facility: CLINIC | Age: 64
End: 2024-12-10
Payer: COMMERCIAL

## 2024-12-10 VITALS
TEMPERATURE: 96.5 F | DIASTOLIC BLOOD PRESSURE: 68 MMHG | SYSTOLIC BLOOD PRESSURE: 108 MMHG | WEIGHT: 179 LBS | BODY MASS INDEX: 25.06 KG/M2 | HEIGHT: 71 IN

## 2024-12-10 DIAGNOSIS — F41.9 ANXIETY: ICD-10-CM

## 2024-12-10 DIAGNOSIS — Z23 NEED FOR VACCINATION: ICD-10-CM

## 2024-12-10 DIAGNOSIS — G45.9 TIA (TRANSIENT ISCHEMIC ATTACK): ICD-10-CM

## 2024-12-10 DIAGNOSIS — I48.0 PAROXYSMAL ATRIAL FIBRILLATION (MULTI): Chronic | ICD-10-CM

## 2024-12-10 DIAGNOSIS — Z00.00 GENERAL MEDICAL EXAM: Primary | ICD-10-CM

## 2024-12-10 LAB
ALBUMIN SERPL BCP-MCNC: 4.4 G/DL (ref 3.4–5)
ALP SERPL-CCNC: 131 U/L (ref 33–136)
ALT SERPL W P-5'-P-CCNC: 120 U/L (ref 10–52)
ANION GAP SERPL CALC-SCNC: 11 MMOL/L (ref 10–20)
APPEARANCE UR: CLEAR
AST SERPL W P-5'-P-CCNC: 85 U/L (ref 9–39)
BASOPHILS # BLD AUTO: 0.04 X10*3/UL (ref 0–0.1)
BASOPHILS NFR BLD AUTO: 0.6 %
BILIRUB SERPL-MCNC: 2.1 MG/DL (ref 0–1.2)
BILIRUB UR STRIP.AUTO-MCNC: NEGATIVE MG/DL
BUN SERPL-MCNC: 20 MG/DL (ref 6–23)
CALCIUM SERPL-MCNC: 9.2 MG/DL (ref 8.6–10.3)
CHLORIDE SERPL-SCNC: 104 MMOL/L (ref 98–107)
CHOLEST SERPL-MCNC: 106 MG/DL (ref 0–199)
CHOLESTEROL/HDL RATIO: 1.8
CO2 SERPL-SCNC: 31 MMOL/L (ref 21–32)
COLOR UR: YELLOW
CREAT SERPL-MCNC: 1.11 MG/DL (ref 0.5–1.3)
EGFRCR SERPLBLD CKD-EPI 2021: 75 ML/MIN/1.73M*2
EOSINOPHIL # BLD AUTO: 0.13 X10*3/UL (ref 0–0.7)
EOSINOPHIL NFR BLD AUTO: 2 %
ERYTHROCYTE [DISTWIDTH] IN BLOOD BY AUTOMATED COUNT: 13.2 % (ref 11.5–14.5)
EST. AVERAGE GLUCOSE BLD GHB EST-MCNC: 131 MG/DL
GLUCOSE SERPL-MCNC: 81 MG/DL (ref 74–99)
GLUCOSE UR STRIP.AUTO-MCNC: NORMAL MG/DL
HBA1C MFR BLD: 6.2 %
HCT VFR BLD AUTO: 42.7 % (ref 41–52)
HDLC SERPL-MCNC: 59.4 MG/DL
HGB BLD-MCNC: 13.5 G/DL (ref 13.5–17.5)
HYALINE CASTS #/AREA URNS AUTO: ABNORMAL /LPF
IMM GRANULOCYTES # BLD AUTO: 0.02 X10*3/UL (ref 0–0.7)
IMM GRANULOCYTES NFR BLD AUTO: 0.3 % (ref 0–0.9)
KETONES UR STRIP.AUTO-MCNC: NEGATIVE MG/DL
LDLC SERPL CALC-MCNC: 36 MG/DL
LEUKOCYTE ESTERASE UR QL STRIP.AUTO: NEGATIVE
LYMPHOCYTES # BLD AUTO: 1.82 X10*3/UL (ref 1.2–4.8)
LYMPHOCYTES NFR BLD AUTO: 27.5 %
MCH RBC QN AUTO: 31.8 PG (ref 26–34)
MCHC RBC AUTO-ENTMCNC: 31.6 G/DL (ref 32–36)
MCV RBC AUTO: 101 FL (ref 80–100)
MONOCYTES # BLD AUTO: 0.49 X10*3/UL (ref 0.1–1)
MONOCYTES NFR BLD AUTO: 7.4 %
MUCOUS THREADS #/AREA URNS AUTO: ABNORMAL /LPF
NEUTROPHILS # BLD AUTO: 4.12 X10*3/UL (ref 1.2–7.7)
NEUTROPHILS NFR BLD AUTO: 62.2 %
NITRITE UR QL STRIP.AUTO: NEGATIVE
NON HDL CHOLESTEROL: 47 MG/DL (ref 0–149)
NRBC BLD-RTO: 0 /100 WBCS (ref 0–0)
PH UR STRIP.AUTO: 6.5 [PH]
PLATELET # BLD AUTO: 214 X10*3/UL (ref 150–450)
POTASSIUM SERPL-SCNC: 4.7 MMOL/L (ref 3.5–5.3)
PROT SERPL-MCNC: 7 G/DL (ref 6.4–8.2)
PROT UR STRIP.AUTO-MCNC: ABNORMAL MG/DL
PSA SERPL-MCNC: 0.51 NG/ML
RBC # BLD AUTO: 4.25 X10*6/UL (ref 4.5–5.9)
RBC # UR STRIP.AUTO: NEGATIVE /UL
RBC #/AREA URNS AUTO: ABNORMAL /HPF
SODIUM SERPL-SCNC: 141 MMOL/L (ref 136–145)
SP GR UR STRIP.AUTO: 1.03
TRIGL SERPL-MCNC: 52 MG/DL (ref 0–149)
TSH SERPL-ACNC: 0.7 MIU/L (ref 0.44–3.98)
UROBILINOGEN UR STRIP.AUTO-MCNC: NORMAL MG/DL
VLDL: 10 MG/DL (ref 0–40)
WBC # BLD AUTO: 6.6 X10*3/UL (ref 4.4–11.3)
WBC #/AREA URNS AUTO: ABNORMAL /HPF

## 2024-12-10 PROCEDURE — 80053 COMPREHEN METABOLIC PANEL: CPT

## 2024-12-10 PROCEDURE — 84443 ASSAY THYROID STIM HORMONE: CPT

## 2024-12-10 PROCEDURE — 81001 URINALYSIS AUTO W/SCOPE: CPT

## 2024-12-10 PROCEDURE — 85025 COMPLETE CBC W/AUTO DIFF WBC: CPT

## 2024-12-10 PROCEDURE — 90750 HZV VACC RECOMBINANT IM: CPT | Performed by: FAMILY MEDICINE

## 2024-12-10 PROCEDURE — 99396 PREV VISIT EST AGE 40-64: CPT | Performed by: FAMILY MEDICINE

## 2024-12-10 PROCEDURE — 1036F TOBACCO NON-USER: CPT | Performed by: FAMILY MEDICINE

## 2024-12-10 PROCEDURE — 90471 IMMUNIZATION ADMIN: CPT | Performed by: FAMILY MEDICINE

## 2024-12-10 PROCEDURE — 83036 HEMOGLOBIN GLYCOSYLATED A1C: CPT

## 2024-12-10 PROCEDURE — 84153 ASSAY OF PSA TOTAL: CPT

## 2024-12-10 PROCEDURE — 80061 LIPID PANEL: CPT

## 2024-12-10 PROCEDURE — 3008F BODY MASS INDEX DOCD: CPT | Performed by: FAMILY MEDICINE

## 2024-12-10 RX ORDER — ESCITALOPRAM OXALATE 20 MG/1
20 TABLET ORAL DAILY
Qty: 90 TABLET | Refills: 1 | Status: SHIPPED | OUTPATIENT
Start: 2024-12-10

## 2024-12-10 ASSESSMENT — ENCOUNTER SYMPTOMS
WOUND: 0
SHORTNESS OF BREATH: 0
ADENOPATHY: 0
BACK PAIN: 1
VOICE CHANGE: 0
ARTHRALGIAS: 1
DIZZINESS: 0
SEIZURES: 0
VOMITING: 0
PALPITATIONS: 0
HEADACHES: 0
SLEEP DISTURBANCE: 0
FEVER: 0
ROS SKIN COMMENTS: NO MOLES GROWING OR CHANGING.
CHILLS: 0
NUMBNESS: 0
SORE THROAT: 0
TROUBLE SWALLOWING: 0
FATIGUE: 1
FACIAL ASYMMETRY: 0
RHINORRHEA: 0
MYALGIAS: 0
NAUSEA: 0
SINUS PAIN: 0
HEMATURIA: 0
ABDOMINAL PAIN: 0
WEAKNESS: 0
DYSPHORIC MOOD: 1
CONSTIPATION: 0
WHEEZING: 0
FREQUENCY: 1
SPEECH DIFFICULTY: 0
DIARRHEA: 0
BLOOD IN STOOL: 0
COUGH: 0
NERVOUS/ANXIOUS: 1
NECK PAIN: 0
DYSURIA: 0

## 2024-12-10 NOTE — PATIENT INSTRUCTIONS
I will increase your Lexapro.  Try to get back in to exercise.  We will await the results of the sleep study and use CPAP if needed.  This could get you more energy and decrease incidence of a fib.  Follow up with Dr Ramicone Friday.  Call [facility] and ask them when your next colonoscopy is due at [phone].  Return in three months for a recheck and sooner if you have new or worsened problems.  Labs were checked today.

## 2024-12-10 NOTE — PROGRESS NOTES
"Subjective   Patient ID: [id number]     Asher Bernabe is a 64 y.o. male who presents for Annual Exam (Fasting).  HPI  He is here for a general medical examination.      He was hospitalized in October for a TIA and atrial fibrillation.  He had several minutes of blurry vision in the right visual fields of both eyes.  He had a right arm tremor and difficulty balancing on the right leg.  He was found to have high grade stenosis at the right vertebral artery and dense atherosclerotic plaque at the left vertebral artery.  Initally placed on aspirin and plavix and this was changed to Eliquis by EPS.      Cardioversion has been planned but it was canceled by Dr Ramicone.  He has an appointment with them for the next steps in treatment.     He had a home sleep study.  Results are not back yet.     He remains on escitalopram, atorvastatin, bisoprolol, Eliquis, famotidine and levothyroxine.      States lexapro is not helping.  He feels \"short tempered\".      His bisoprolol was decreased from 5 mg to 2.5 mg daily due to fatigue. This was just decreased on Saturday.      He has the lack of motivation to get things done.      No neurological deficits since the TIA.  Nothing has come back.     Current Outpatient Medications on File Prior to Visit   Medication Sig Dispense Refill    apixaban (Eliquis) 5 mg tablet Take 1 tablet (5 mg) by mouth 2 times a day. 180 tablet 1    atorvastatin (Lipitor) 40 mg tablet Take 1 tablet (40 mg) by mouth once daily at bedtime. 90 tablet 1    bisoprolol (Zebeta) 5 mg tablet Take 0.5 tablets (2.5 mg) by mouth once daily.      famotidine (Pepcid) 20 mg tablet Take 1 tablet (20 mg) by mouth once daily.      levothyroxine (Synthroid, Levoxyl) 112 mcg tablet Take 1 tablet (112 mcg) by mouth once daily. 90 tablet 3    [DISCONTINUED] bisoprolol (Zebeta) 5 mg tablet Take 1 tablet (5 mg) by mouth once daily. 90 tablet 1    [DISCONTINUED] escitalopram (Lexapro) 10 mg tablet Take 1 tablet (10 mg) by mouth " once daily. 90 tablet 3     No current facility-administered medications on file prior to visit.     Tobacco Use: Low Risk  (12/10/2024)    Patient History     Smoking Tobacco Use: Never     Smokeless Tobacco Use: Never     Passive Exposure: Not on file   Five drinks in ten days.  No drug use.  Never used drugs.  He exercises but less than before due to lack of motivation.  Was playing volleyball weekly but not anymore.   He maintains a healthy diet.  Eats salads every day.  Family History   Problem Relation Name Age of Onset    Lung cancer Mother      Other (carotid blockage) Father      Hyperlipidemia Father     Brother and sister with a fib.  Sister with hypertension.  Review of Systems   Constitutional:  Positive for fatigue. Negative for chills and fever.   HENT:  Positive for nosebleeds (only two since October.  one was this morning.). Negative for congestion, rhinorrhea, sinus pain, sore throat, trouble swallowing and voice change.    Respiratory:  Negative for cough, shortness of breath and wheezing.    Cardiovascular:  Negative for chest pain, palpitations (does not feel when he is in a fib.) and leg swelling.   Gastrointestinal:  Negative for abdominal pain, blood in stool, constipation, diarrhea, nausea and vomiting.   Genitourinary:  Positive for frequency (twice  a night.). Negative for dysuria and hematuria.   Musculoskeletal:  Positive for arthralgias (right ankle pain) and back pain (chronic low back pain.). Negative for myalgias and neck pain.   Skin:  Negative for rash and wound.        No moles growing or changing.   Neurological:  Negative for dizziness, seizures, syncope, facial asymmetry, speech difficulty, weakness, numbness and headaches.   Hematological:  Negative for adenopathy.   Psychiatric/Behavioral:  Positive for dysphoric mood. Negative for self-injury, sleep disturbance (sleep study pending.) and suicidal ideas. The patient is nervous/anxious.        Objective     /68 (BP  "Location: Left arm, Patient Position: Sitting)   Temp 35.8 °C (96.5 °F) (Skin)   Ht 1.803 m (5' 11\")   Wt 81.2 kg (179 lb)   BMI 24.97 kg/m²      Physical Exam  Vitals reviewed.   Constitutional:       General: He is not in acute distress.     Appearance: Normal appearance. He is not ill-appearing or toxic-appearing.   HENT:      Head: Normocephalic and atraumatic.      Right Ear: Tympanic membrane, ear canal and external ear normal.      Left Ear: Tympanic membrane, ear canal and external ear normal.      Nose: Nose normal.      Mouth/Throat:      Mouth: Mucous membranes are moist.   Eyes:      Extraocular Movements: Extraocular movements intact.      Conjunctiva/sclera: Conjunctivae normal.      Pupils: Pupils are equal, round, and reactive to light.   Cardiovascular:      Rate and Rhythm: Normal rate and regular rhythm.      Heart sounds: No murmur heard.  Pulmonary:      Effort: Pulmonary effort is normal.      Breath sounds: Normal breath sounds.   Abdominal:      General: Bowel sounds are normal. There is no distension.      Palpations: Abdomen is soft. There is no mass.      Tenderness: There is no abdominal tenderness. There is no guarding or rebound.   Musculoskeletal:         General: No tenderness.      Cervical back: Neck supple.      Right lower leg: No edema.      Left lower leg: No edema.   Skin:     Coloration: Skin is not jaundiced or pale.      Findings: No rash.   Neurological:      General: No focal deficit present.      Mental Status: He is alert and oriented to person, place, and time. Mental status is at baseline.      Cranial Nerves: No cranial nerve deficit.      Sensory: No sensory deficit.      Motor: No weakness.      Coordination: Coordination normal.      Gait: Gait normal.   Psychiatric:         Behavior: Behavior normal.         Thought Content: Thought content normal.         Judgment: Judgment normal.      Comments: Depressed affect.      Presently NSR.  He was told from his " cardiac monitor that he was in a fib about 70% of the time.      His last colonoscopy in the chart was 2014.  He thinks maybe he had one sooner than that like 2019.  Recommend call them and check when he is due.    Assessment/Plan   Problem List Items Addressed This Visit       Anxiety    Relevant Medications    escitalopram (Lexapro) 20 mg tablet    Paroxysmal atrial fibrillation (Multi) (Chronic)    TIA (transient ischemic attack)     Other Visit Diagnoses       General medical exam    -  Primary    Relevant Orders    Urinalysis with Reflex Microscopic    Thyroid Stimulating Hormone    Hemoglobin A1C    Lipid Panel    CBC and Auto Differential    Comprehensive Metabolic Panel    Prostate Specific Antigen    Need for vaccination            I will increase your Lexapro.  Try to get back in to exercise.  We will await the results of the sleep study and use CPAP if needed.  This could get you more energy and decrease incidence of a fib.  Follow up with Dr Ramicone Friday.  Call [facility] and ask them when your next colonoscopy is due at [phone].  Return in three months for a recheck and sooner if you have new or worsened problems.  Labs were checked today.    Tyree Box, DO

## 2024-12-12 NOTE — PROGRESS NOTES
"CARDIAC ELECTROPHYSIOLOGY PROGRESS NOTE    History Of Present Illness:      This is a 64-year-old male with a history of atrial fibrillation.  He presents today for a follow-up evaluation.  The patient is reporting an increase in fatigue and has had a decrease in exercise tolerance.  He has been tachycardic at times and completed a Holter monitor.    Asher was initially seen in consultation on November 1, 2024 at the request of Dr. Tyree Box.  The patient reports having had a TIA in September.  Initially his symptoms were dizziness, then he had transient vision loss involving part of his right sided visual field.  The vision recovered but at that time he was not sure if this was a retinal issue since he had a history of retinal detachment.  Subsequent evaluation during an echocardiogram yielded atrial fibrillation and he has been on Eliquis since October 3, 2024.  The atrial fibrillation was identified on October 2, 2024 at the time of an echocardiogram.  He has been feeling fatigued and somewhat short of breath at times on exertion.     Past medical history:     Hypothyroidism  Retinal detachment  Right vertebral artery stenosis  Paroxysmal atrial fibrillation  TIA Sept 2024     Past surgical history: Pyloric stenosis at 6 months of age     Social history: Non-smoker, occasional alcohol use     Family history: Positive for CAD    Review of Systems  Other review of systems negative     Last Recorded Vitals:      11/13/2023     8:04 AM 7/30/2024     8:52 AM 10/10/2024     2:29 PM 11/1/2024     8:10 AM 11/7/2024    10:50 AM 11/14/2024    10:05 AM 12/10/2024     8:06 AM   Vitals   Systolic 118 122 104 110 102 102 108   Diastolic 70 70 72 64 68 60 68   BP Location Left arm Left arm Left arm Right arm Right arm Left arm Left arm   Heart Rate    71 55     Temp 35.9 °C (96.7 °F) 36.6 °C (97.8 °F) 36.2 °C (97.2 °F)  35.9 °C (96.7 °F) 35.9 °C (96.6 °F) 35.8 °C (96.5 °F)   Resp     18     Height 1.791 m (5' 10.5\")   " "1.791 m (5' 10.5\") 1.791 m (5' 10.5\")  1.803 m (5' 11\")   Weight (lb) 170 171 181 180 180 180 179   BMI 24.05 kg/m2 24.19 kg/m2 25.6 kg/m2 25.46 kg/m2 25.46 kg/m2 25.46 kg/m2 24.97 kg/m2   BSA (m2) 1.96 m2 1.96 m2 2.02 m2 2.01 m2 2.01 m2 2.01 m2 2.02 m2   Visit Report Report Report Report Report Report Report Report     Allergies:  Patient has no known allergies.    Outpatient Medications:  Current Outpatient Medications   Medication Instructions    apixaban (ELIQUIS) 5 mg, oral, 2 times daily    atorvastatin (LIPITOR) 40 mg, oral, Nightly    bisoprolol (ZEBETA) 2.5 mg, oral, Daily    escitalopram (LEXAPRO) 20 mg, oral, Daily    famotidine (Pepcid) 20 mg tablet 1 tablet, Daily    levothyroxine (SYNTHROID, LEVOXYL) 112 mcg, oral, Daily       Physical Exam:    General Appearance:  Alert, oriented, no distress  Skin:  Warm and dry  Head and Neck:  No elevation of JVP, no carotid bruits  Cardiac Exam:  Rhythm is irregular, S1 and S2 are normal, no murmur S3 or S4  Lungs:  Clear to auscultation  Extremities:  no edema  Neurologic:  No focal deficits  Psychiatric:  Appropriate mood and behavior    Lab Results:    CMP:  Recent Labs     12/10/24  0857 07/30/24  0924 11/13/23  0844 11/07/22  0842 11/01/21  1100 10/23/20  0839 10/28/19  1055 10/15/19  1022    140 138 142 138 140 139 141   K 4.7 4.6 4.1 4.6 4.4 4.5 4.7 5.5*    103 101 102 102 101 102 103   CO2 31 30 34* 34* 32 30 32 32   ANIONGAP 11 12 7* 11 8* 14 10 12   BUN 20 13 14 15 13 13 17 19   CREATININE 1.11 1.07 0.99 1.00 1.04 1.02 1.00 1.03   EGFR 75 78 86  --   --   --   --   --      Recent Labs     12/10/24  0857 07/30/24  0924 11/13/23  0844 11/07/22  0842 11/01/21  1100   ALBUMIN 4.4 4.6 4.2 4.3 4.4   ALKPHOS 131 77 74 66 73   * 20 24 22 22   AST 85* 27 31 29 33   BILITOT 2.1* 1.6* 1.2 1.6* 2.1*     CBC:  Recent Labs     12/10/24  0857 07/30/24  0924 11/13/23  0844 11/07/22  0842 11/01/21  1100 10/23/20  0839 10/15/19  1022 10/05/18  0845 " "  WBC 6.6 6.4 5.1 5.1 4.6 3.6* 5.0 4.3*   HGB 13.5 15.4 14.7 14.3 15.8 14.6 15.0 15.3   HCT 42.7 47.6 45.3 44.4 48.8 46.6 47.2 46.6    282 298 261 257 285 234 227   * 99 100 100 99 102* 98 99     COAG: No results for input(s): \"PTT\", \"INR\", \"HAUF\", \"DDIMERVTE\", \"HAPTOGLOBIN\", \"FIBRINOGEN\" in the last 68763 hours.  Cardiology Tests:  I have personally review the diagnostic cardiac testing and my interpretation is as follows:    Echocardiogram October 2, 2024: Normal left ventricular size and function, no PFO.     Electrocardiogram November 1, 2024: Atrial flutter with variable AV conduction.    Assessment/Plan   Problem List Items Addressed This Visit             ICD-10-CM    High risk medication use - Primary Z79.899     No bleeding problems on Eliquis.         Paroxysmal atrial fibrillation (Multi) (Chronic) I48.0     Asher has a history of both atrial fibrillation and atrial flutter.  The Holter monitor showed a very high burden of atrial fibrillation with RVR.  The total AF burden was 75%.  Today he is in atrial flutter.  Given the patient's young age and high arrhythmia burden of both atrial fibrillation and atrial flutter, catheter ablation is recommended.  I feel that the patient would have better long-term control of the atrial fibrillation with pulmonary vein isolation versus antiarrhythmic drug therapy.  The PVI procedure and risks were discussed with the patient and his wife.  Since he is in atrial flutter today, we would plan for radiofrequency ablation of the atrial flutter at the time of the pulmonary vein isolation procedure.  We will continue with uninterrupted anticoagulation leading up to the ablation.  For long-term management the patient will benefit greatly from pulmonary vein isolation and RF ablation of the atrial flutter compared to using an antiarrhythmic drug.  The patient and his wife are in agreement with the recommendations and this will be scheduled at [facility] in the near future.         Relevant Orders    ECG 12 lead (Clinic Performed) (Completed)     James C Ramicone, DO

## 2024-12-13 ENCOUNTER — OFFICE VISIT (OUTPATIENT)
Dept: CARDIOLOGY | Facility: CLINIC | Age: 64
End: 2024-12-13
Payer: COMMERCIAL

## 2024-12-13 VITALS
BODY MASS INDEX: 25.34 KG/M2 | SYSTOLIC BLOOD PRESSURE: 106 MMHG | DIASTOLIC BLOOD PRESSURE: 68 MMHG | OXYGEN SATURATION: 97 % | HEART RATE: 90 BPM | WEIGHT: 181 LBS | HEIGHT: 71 IN

## 2024-12-13 DIAGNOSIS — I48.0 PAROXYSMAL ATRIAL FIBRILLATION (MULTI): Chronic | ICD-10-CM

## 2024-12-13 DIAGNOSIS — Z79.899 HIGH RISK MEDICATION USE: Primary | ICD-10-CM

## 2024-12-13 LAB
ATRIAL RATE: 293 BPM
Q ONSET: 220 MS
QRS COUNT: 21 BEATS
QRS DURATION: 84 MS
QT INTERVAL: 354 MS
QTC CALCULATION(BAZETT): 522 MS
QTC FREDERICIA: 459 MS
R AXIS: 80 DEGREES
T AXIS: 69 DEGREES
T OFFSET: 397 MS
VENTRICULAR RATE: 131 BPM

## 2024-12-13 PROCEDURE — 93005 ELECTROCARDIOGRAM TRACING: CPT | Performed by: INTERNAL MEDICINE

## 2024-12-13 PROCEDURE — 99215 OFFICE O/P EST HI 40 MIN: CPT | Performed by: INTERNAL MEDICINE

## 2024-12-13 PROCEDURE — 3008F BODY MASS INDEX DOCD: CPT | Performed by: INTERNAL MEDICINE

## 2024-12-13 PROCEDURE — 1036F TOBACCO NON-USER: CPT | Performed by: INTERNAL MEDICINE

## 2024-12-13 NOTE — ASSESSMENT & PLAN NOTE
Asher has a history of both atrial fibrillation and atrial flutter.  The Holter monitor showed a very high burden of atrial fibrillation with RVR.  The total AF burden was 75%.  Today he is in atrial flutter.  Given the patient's young age and high arrhythmia burden of both atrial fibrillation and atrial flutter, catheter ablation is recommended.  I feel that the patient would have better long-term control of the atrial fibrillation with pulmonary vein isolation versus antiarrhythmic drug therapy.  The PVI procedure and risks were discussed with the patient and his wife.  Since he is in atrial flutter today, we would plan for radiofrequency ablation of the atrial flutter at the time of the pulmonary vein isolation procedure.  We will continue with uninterrupted anticoagulation leading up to the ablation.  For long-term management the patient will benefit greatly from pulmonary vein isolation and RF ablation of the atrial flutter compared to using an antiarrhythmic drug.  The patient and his wife are in agreement with the recommendations and this will be scheduled at [facility] in the near future.

## 2024-12-13 NOTE — LETTER
December 13, 2024     Tyree Box [place]  [id number] [facility]  [address]  [place] [address]    Patient: Asher Bernabe   YOB: 1960   Date of Visit: 12/13/2024       Dear Dr. Tyree Box DO:    Thank you for referring Asher Bernabe to me for evaluation. Below are my notes for this consultation.  If you have questions, please do not hesitate to call me. I look forward to following your patient along with you.       Sincerely,     James C Ramicone, DO      CC: No Recipients  ______________________________________________________________________________________    CARDIAC ELECTROPHYSIOLOGY PROGRESS NOTE    History Of Present Illness:      This is a 64-year-old male with a history of atrial fibrillation.  He presents today for a follow-up evaluation.  The patient is reporting an increase in fatigue and has had a decrease in exercise tolerance.  He has been tachycardic at times and completed a Holter monitor.    Asher was initially seen in consultation on November 1, 2024 at the request of Dr. Tyree Box.  The patient reports having had a TIA in September.  Initially his symptoms were dizziness, then he had transient vision loss involving part of his left sided visual field.  The vision recovered but at that time he was not sure if this was a retinal issue since he had a history of retinal detachment.  Subsequent evaluation during an echocardiogram yielded atrial fibrillation and he has been on Eliquis since October 3, 2024.  The atrial fibrillation was identified on October 2, 2024 at the time of an echocardiogram.  He has been feeling fatigued and somewhat short of breath at times on exertion.     Past medical history:     Hypothyroidism  Retinal detachment  Right vertebral artery stenosis  Paroxysmal atrial fibrillation  TIA Sept 2024     Past surgical history: Pyloric stenosis at 6 months of age     Social history: Non-smoker, occasional alcohol use     Family history: Positive for  "CAD    Review of Systems  Other review of systems negative     Last Recorded Vitals:      11/13/2023     8:04 AM 7/30/2024     8:52 AM 10/10/2024     2:29 PM 11/1/2024     8:10 AM 11/7/2024    10:50 AM 11/14/2024    10:05 AM 12/10/2024     8:06 AM   Vitals   Systolic 118 122 104 110 102 102 108   Diastolic 70 70 72 64 68 60 68   BP Location Left arm Left arm Left arm Right arm Right arm Left arm Left arm   Heart Rate    71 55     Temp 35.9 °C (96.7 °F) 36.6 °C (97.8 °F) 36.2 °C (97.2 °F)  35.9 °C (96.7 °F) 35.9 °C (96.6 °F) 35.8 °C (96.5 °F)   Resp     18     Height 1.791 m (5' 10.5\")   1.791 m (5' 10.5\") 1.791 m (5' 10.5\")  1.803 m (5' 11\")   Weight (lb) 170 171 181 180 180 180 179   BMI 24.05 kg/m2 24.19 kg/m2 25.6 kg/m2 25.46 kg/m2 25.46 kg/m2 25.46 kg/m2 24.97 kg/m2   BSA (m2) 1.96 m2 1.96 m2 2.02 m2 2.01 m2 2.01 m2 2.01 m2 2.02 m2   Visit Report Report Report Report Report Report Report Report     Allergies:  Patient has no known allergies.    Outpatient Medications:  Current Outpatient Medications   Medication Instructions   • apixaban (ELIQUIS) 5 mg, oral, 2 times daily   • atorvastatin (LIPITOR) 40 mg, oral, Nightly   • bisoprolol (ZEBETA) 2.5 mg, oral, Daily   • escitalopram (LEXAPRO) 20 mg, oral, Daily   • famotidine (Pepcid) 20 mg tablet 1 tablet, Daily   • levothyroxine (SYNTHROID, LEVOXYL) 112 mcg, oral, Daily       Physical Exam:    General Appearance:  Alert, oriented, no distress  Skin:  Warm and dry  Head and Neck:  No elevation of JVP, no carotid bruits  Cardiac Exam:  Rhythm is irregular, S1 and S2 are normal, no murmur S3 or S4  Lungs:  Clear to auscultation  Extremities:  no edema  Neurologic:  No focal deficits  Psychiatric:  Appropriate mood and behavior    Lab Results:    CMP:  Recent Labs     12/10/24  0857 07/30/24  0924 11/13/23  0844 11/07/22  0842 11/01/21  1100 10/23/20  0839 10/28/19  1055 10/15/19  1022    140 138 142 138 140 139 141   K 4.7 4.6 4.1 4.6 4.4 4.5 4.7 5.5*    " "103 101 102 102 101 102 103   CO2 31 30 34* 34* 32 30 32 32   ANIONGAP 11 12 7* 11 8* 14 10 12   BUN 20 13 14 15 13 13 17 19   CREATININE 1.11 1.07 0.99 1.00 1.04 1.02 1.00 1.03   EGFR 75 78 86  --   --   --   --   --      Recent Labs     12/10/24  0857 07/30/24  0924 11/13/23  0844 11/07/22  0842 11/01/21  1100   ALBUMIN 4.4 4.6 4.2 4.3 4.4   ALKPHOS 131 77 74 66 73   * 20 24 22 22   AST 85* 27 31 29 33   BILITOT 2.1* 1.6* 1.2 1.6* 2.1*     CBC:  Recent Labs     12/10/24  0857 07/30/24  0924 11/13/23  0844 11/07/22  0842 11/01/21  1100 10/23/20  0839 10/15/19  1022 10/05/18  0845   WBC 6.6 6.4 5.1 5.1 4.6 3.6* 5.0 4.3*   HGB 13.5 15.4 14.7 14.3 15.8 14.6 15.0 15.3   HCT 42.7 47.6 45.3 44.4 48.8 46.6 47.2 46.6    282 298 261 257 285 234 227   * 99 100 100 99 102* 98 99     COAG: No results for input(s): \"PTT\", \"INR\", \"HAUF\", \"DDIMERVTE\", \"HAPTOGLOBIN\", \"FIBRINOGEN\" in the last 42626 hours.  Cardiology Tests:  I have personally review the diagnostic cardiac testing and my interpretation is as follows:    Echocardiogram October 2, 2024: Normal left ventricular size and function, no PFO.     Electrocardiogram November 1, 2024: Atrial flutter with variable AV conduction.    Assessment/Plan  Problem List Items Addressed This Visit             ICD-10-CM    High risk medication use - Primary Z79.899     No bleeding problems on Eliquis.         Paroxysmal atrial fibrillation (Multi) (Chronic) I48.0     Asher has a history of both atrial fibrillation and atrial flutter.  The Holter monitor showed a very high burden of atrial fibrillation with RVR.  The total AF burden was 75%.  Today he is in atrial flutter.  Given the patient's young age and high arrhythmia burden of both atrial fibrillation and atrial flutter, catheter ablation is recommended.  I feel that the patient would have better long-term control of the atrial fibrillation with pulmonary vein isolation versus antiarrhythmic drug therapy.  The " PVI procedure and risks were discussed with the patient and his wife.  Since he is in atrial flutter today, we would plan for radiofrequency ablation of the atrial flutter at the time of the pulmonary vein isolation procedure.  We will continue with uninterrupted anticoagulation leading up to the ablation.  For long-term management the patient will benefit greatly from pulmonary vein isolation and RF ablation of the atrial flutter compared to using an antiarrhythmic drug.  The patient and his wife are in agreement with the recommendations and this will be scheduled at [facility] in the near future.         Relevant Orders    ECG 12 lead (Clinic Performed) (Completed)     James C Ramicone, DO

## 2024-12-15 DIAGNOSIS — R74.8 ELEVATED LIVER ENZYMES: Primary | ICD-10-CM

## 2025-01-09 ENCOUNTER — TELEPHONE (OUTPATIENT)
Dept: PRIMARY CARE | Facility: CLINIC | Age: 65
End: 2025-01-09

## 2025-01-09 NOTE — TELEPHONE ENCOUNTER
"\"Calling to report that I have decreased back to 10mg of Lexapro.  I was much more agitated on 20mg dosage.\"  Would like to speak with Dr. Box about elevated liver enzymes and Lipitor can raise liver enzymes.  "

## 2025-01-10 DIAGNOSIS — I48.91 ATRIAL FIBRILLATION, UNSPECIFIED TYPE (MULTI): ICD-10-CM

## 2025-01-10 RX ORDER — BISOPROLOL FUMARATE 5 MG/1
2.5 TABLET, FILM COATED ORAL DAILY
Qty: 45 TABLET | Refills: 1 | Status: SHIPPED | OUTPATIENT
Start: 2025-01-10

## 2025-01-10 NOTE — TELEPHONE ENCOUNTER
Pt is calling for a refill on his Bisoprolol. Pt's cardiologist Dr. Ramicone decreased his dosage to 2.5 MG instead of 5 MG but didn't send in a prescription. Pt states that the script they had on file for him . Can you send this in or does pt need to contact his cardiologist?    REFILL  MEDICATION:     Bisoprolol 5 MG; Take 1/2 tablet daily.     PHARM: [facility]     LR: 10/10/24    90 tablets with 1 refill   LV: 12/10/24  NV: 3/13/25

## 2025-01-20 DIAGNOSIS — I48.91 ATRIAL FIBRILLATION, UNSPECIFIED TYPE (MULTI): ICD-10-CM

## 2025-01-20 RX ORDER — BISOPROLOL FUMARATE 5 MG/1
5 TABLET, FILM COATED ORAL DAILY
Start: 2025-01-20 | End: 2025-01-22 | Stop reason: SDUPTHER

## 2025-01-22 DIAGNOSIS — I48.91 ATRIAL FIBRILLATION, UNSPECIFIED TYPE (MULTI): ICD-10-CM

## 2025-01-22 RX ORDER — BISOPROLOL FUMARATE 5 MG/1
5 TABLET, FILM COATED ORAL DAILY
Qty: 90 TABLET | Refills: 3 | Status: SHIPPED | OUTPATIENT
Start: 2025-01-22

## 2025-02-11 ENCOUNTER — APPOINTMENT (OUTPATIENT)
Dept: PRIMARY CARE | Facility: CLINIC | Age: 65
End: 2025-02-11
Payer: COMMERCIAL

## 2025-02-11 DIAGNOSIS — Z23 NEED FOR VACCINATION: Primary | ICD-10-CM

## 2025-02-11 PROCEDURE — 90750 HZV VACC RECOMBINANT IM: CPT | Performed by: FAMILY MEDICINE

## 2025-02-11 PROCEDURE — 90471 IMMUNIZATION ADMIN: CPT | Performed by: FAMILY MEDICINE

## 2025-02-11 NOTE — PROGRESS NOTES
Patient is here for dose #2 of Shingrix.  Vaccine vial was viewed and verified by Dr. Box prior to administration.    He received Shingrix 0.5mL intramuscularly to left deltoid.  Patient tolerated vaccine administration well.    Lot#     [id number]  Exp:      11/15/2026  NDC#   25935-181-89

## 2025-02-14 ENCOUNTER — APPOINTMENT (OUTPATIENT)
Dept: PRIMARY CARE | Facility: CLINIC | Age: 65
End: 2025-02-14
Payer: COMMERCIAL

## 2025-03-03 ENCOUNTER — TELEPHONE (OUTPATIENT)
Dept: CARDIOLOGY | Facility: CLINIC | Age: 65
End: 2025-03-03
Payer: COMMERCIAL

## 2025-03-03 NOTE — TELEPHONE ENCOUNTER
Please call patient to R/S Ablation that is Thursday 3/6. His acid refulx is very bad and has concerns about laying flat for the Ablation.

## 2025-03-13 ENCOUNTER — APPOINTMENT (OUTPATIENT)
Dept: PRIMARY CARE | Facility: CLINIC | Age: 65
End: 2025-03-13
Payer: COMMERCIAL

## 2025-03-13 VITALS
WEIGHT: 177 LBS | TEMPERATURE: 95.8 F | DIASTOLIC BLOOD PRESSURE: 62 MMHG | SYSTOLIC BLOOD PRESSURE: 128 MMHG | BODY MASS INDEX: 24.69 KG/M2

## 2025-03-13 DIAGNOSIS — I48.0 PAROXYSMAL ATRIAL FIBRILLATION (MULTI): Primary | ICD-10-CM

## 2025-03-13 DIAGNOSIS — I65.01 STENOSIS OF RIGHT VERTEBRAL ARTERY: ICD-10-CM

## 2025-03-13 DIAGNOSIS — K21.00 GASTROESOPHAGEAL REFLUX DISEASE WITH ESOPHAGITIS WITHOUT HEMORRHAGE: ICD-10-CM

## 2025-03-13 DIAGNOSIS — E03.9 HYPOTHYROIDISM, UNSPECIFIED TYPE: ICD-10-CM

## 2025-03-13 DIAGNOSIS — G45.9 TIA (TRANSIENT ISCHEMIC ATTACK): ICD-10-CM

## 2025-03-13 DIAGNOSIS — I48.91 ATRIAL FIBRILLATION, UNSPECIFIED TYPE (MULTI): ICD-10-CM

## 2025-03-13 PROCEDURE — 1036F TOBACCO NON-USER: CPT | Performed by: FAMILY MEDICINE

## 2025-03-13 PROCEDURE — 99214 OFFICE O/P EST MOD 30 MIN: CPT | Performed by: FAMILY MEDICINE

## 2025-03-13 RX ORDER — ATORVASTATIN CALCIUM 40 MG/1
40 TABLET, FILM COATED ORAL NIGHTLY
Qty: 90 TABLET | Refills: 1 | Status: SHIPPED | OUTPATIENT
Start: 2025-03-13 | End: 2025-09-09

## 2025-03-13 NOTE — PATIENT INSTRUCTIONS
You have decided you do not want an ablation.  Stay on the Eliquis.  Continue your present medication.  I recommend that you see Dr Ramicone to see what other treatments for a fib are appropriate.  Medications can be used to try to keep you out of atrial fibrillation.      I recommend that you see Dr Ramicone again to discuss alternative treatments of a fib, including possible Tikosyn or flecainide or cardioversion.     Return after seeing Dr Ramicone.      Your reflux is well controlled with Pepcid and dietary modifications for now.

## 2025-03-13 NOTE — PROGRESS NOTES
"Subjective   Patient ID: [id number]     Asher Bernabe is a 64 y.o. male who presents for Follow-up and Med Refill.  HPI  He is here for a recheck.  He has HTN, a fib and has had a TIA in September.  He has been on Eliquis since 10/3/24.  He had an echocardiogram that revealed atrial fibrillation on October 2, 2024.  He is seeing Dr Ramicone, EPS.  His note from 12/13/24 was reviewed today.  He recommended radiofrequency ablation of the atrial fib and flutter.  This was scheduled for 3/6/25 but he canceled it due to bad acid reflux symptoms.    He decided that the risks of the ablation were \"too high\" and he did not want to do it.     He states he would like to pursue management of his a fib with medication only.      No dizziness, fainting, SOB, chest pain.  He does feel palpitations--they are no worse than usual.          Objective     /62 (BP Location: Right arm, Patient Position: Sitting)   Temp 35.4 °C (95.8 °F) (Skin)   Wt 80.3 kg (177 lb)   BMI 24.69 kg/m²      Physical Exam  Constitutional:       Appearance: Normal appearance.   Cardiovascular:      Rate and Rhythm: Normal rate and regular rhythm.      Heart sounds: Normal heart sounds. No murmur heard.     Comments: No a fib at present.   Pulmonary:      Effort: Pulmonary effort is normal. No respiratory distress.      Breath sounds: Normal breath sounds.   Neurological:      General: No focal deficit present.      Mental Status: He is alert and oriented to person, place, and time. Mental status is at baseline.         Assessment/Plan   Problem List Items Addressed This Visit       Hypothyroidism    Paroxysmal atrial fibrillation (Multi) - Primary (Chronic)    TIA (transient ischemic attack)     Other Visit Diagnoses       Gastroesophageal reflux disease with esophagitis without hemorrhage            You have decided you do not want an ablation.  Stay on the Eliquis.  Continue your present medication.  I recommend that you see Dr Ramicone to see " what other treatments for a fib are appropriate.  Medications can be used to try to keep you out of atrial fibrillation.      I recommend that you see Dr Ramicone again to discuss alternative treatments of a fib, including possible Tikosyn or flecainide or cardioversion.     Return after seeing Dr Ramicone.      Tyree Box, DO

## 2025-03-16 NOTE — PROGRESS NOTES
History Of Present Illness:      This is a 64-year-old male with a history of atrial fibrillation.  He presents today for a follow-up evaluation.  He is monitoring the episodes of atrial fibrillation via a [employer] mobile device and is still having paroxysmal atrial fibrillation.  He was scheduled for ablation but had to cancel because of problems with GERD.  At this time he would prefer to try medical management rather than ablation.       Asher was initially seen in consultation on November 1, 2024 at the request of Dr. Tyree Box.  The patient reports having had a TIA in September.  Initially his symptoms were dizziness, then he had transient vision loss involving part of his right sided visual field.  The vision recovered but at that time he was not sure if this was a retinal issue since he had a history of retinal detachment.  Subsequent evaluation during an echocardiogram yielded atrial fibrillation and he has been on Eliquis since October 3, 2024.  The atrial fibrillation was identified on October 2, 2024 at the time of an echocardiogram.  He has been feeling fatigued and somewhat short of breath at times on exertion.     Past medical history:     Hypothyroidism  Retinal detachment  Right vertebral artery stenosis  Paroxysmal atrial fibrillation  TIA Sept 2024     Past surgical history: Pyloric stenosis at 6 months of age     Social history: Non-smoker, occasional alcohol use     Family history: Positive for CAD    Review of Systems  Other review of systems negative  Last Recorded Vitals:      10/10/2024     2:29 PM 11/1/2024     8:10 AM 11/7/2024    10:50 AM 11/14/2024    10:05 AM 12/10/2024     8:06 AM 12/13/2024     9:38 AM 3/13/2025    10:02 AM   Vitals   Systolic 104 110 102 102 108 106 128   Diastolic 72 64 68 60 68 68 62   BP Location Left arm Right arm Right arm Left arm Left arm Right arm Right arm   Heart Rate  71 55   90    Temp 36.2 °C (97.2 °F)  35.9 °C (96.7 °F) 35.9 °C (96.6 °F) 35.8  "°C (96.5 °F)  35.4 °C (95.8 °F)   Resp   18       Height  1.791 m (5' 10.5\") 1.791 m (5' 10.5\")  1.803 m (5' 11\") 1.803 m (5' 11\")    Weight (lb) 181 180 180 180 179 181 177   BMI 25.6 kg/m2 25.46 kg/m2 25.46 kg/m2 25.46 kg/m2 24.97 kg/m2 25.24 kg/m2 24.69 kg/m2   BSA (m2) 2.02 m2 2.01 m2 2.01 m2 2.01 m2 2.02 m2 2.03 m2 2.01 m2   Visit Report Report Report Report Report Report Report Report     Allergies:  Patient has no known allergies.  Outpatient Medications:  Current Outpatient Medications   Medication Instructions    apixaban (ELIQUIS) 5 mg, oral, 2 times daily    atorvastatin (LIPITOR) 40 mg, oral, Nightly    bisoprolol (ZEBETA) 5 mg, oral, Daily    escitalopram (LEXAPRO) 20 mg, oral, Daily    famotidine (Pepcid) 20 mg tablet 1 tablet, Daily    levothyroxine (SYNTHROID, LEVOXYL) 112 mcg, oral, Daily       Physical Exam:    General Appearance:  Alert, oriented, no distress  Skin:  Warm and dry  Head and Neck:  No elevation of JVP, no carotid bruits  Cardiac Exam:  Rhythm is regular, S1 and S2 are normal, no murmur S3 or S4  Lungs:  Clear to auscultation  Extremities:  no edema  Neurologic:  No focal deficits  Psychiatric:  Appropriate mood and behavior    Lab Results:    CMP:  Recent Labs     12/10/24  0857 07/30/24  0924 11/13/23  0844 11/07/22  0842 11/01/21  1100 10/23/20  0839 10/28/19  1055 10/15/19  1022    140 138 142 138 140 139 141   K 4.7 4.6 4.1 4.6 4.4 4.5 4.7 5.5*    103 101 102 102 101 102 103   CO2 31 30 34* 34* 32 30 32 32   ANIONGAP 11 12 7* 11 8* 14 10 12   BUN 20 13 14 15 13 13 17 19   CREATININE 1.11 1.07 0.99 1.00 1.04 1.02 1.00 1.03   EGFR 75 78 86  --   --   --   --   --      Recent Labs     12/10/24  0857 07/30/24  0924 11/13/23  0844 11/07/22  0842 11/01/21  1100   ALBUMIN 4.4 4.6 4.2 4.3 4.4   ALKPHOS 131 77 74 66 73   * 20 24 22 22   AST 85* 27 31 29 33   BILITOT 2.1* 1.6* 1.2 1.6* 2.1*     CBC:  Recent Labs     12/10/24  0857 07/30/24  0924 11/13/23  0844 " "11/07/22  0842 11/01/21  1100 10/23/20  0839 10/15/19  1022 10/05/18  0845   WBC 6.6 6.4 5.1 5.1 4.6 3.6* 5.0 4.3*   HGB 13.5 15.4 14.7 14.3 15.8 14.6 15.0 15.3   HCT 42.7 47.6 45.3 44.4 48.8 46.6 47.2 46.6    282 298 261 257 285 234 227   * 99 100 100 99 102* 98 99     COAG: No results for input(s): \"PTT\", \"INR\", \"HAUF\", \"DDIMERVTE\", \"HAPTOGLOBIN\", \"FIBRINOGEN\" in the last 88791 hours.  Cardiology Tests (personally reviewed):    Echocardiogram October 2, 2024: Normal left ventricular size and function, no PFO.     Electrocardiogram November 1, 2024: Atrial flutter with variable AV conduction.       Assessment/Plan   Problem List Items Addressed This Visit             ICD-10-CM    Anxiety F41.9    Relevant Medications    escitalopram (Lexapro) 10 mg tablet    High risk medication use - Primary Z79.899     No bleeding problems on Eliquis.  Flecainide is recommended for AF suppression.  Potential side effects discussed.  The dose of Lexapro that he is taking is 10 mg daily.         Paroxysmal atrial fibrillation (Multi) (Chronic) I48.0     Asher is having frequent episodes of atrial fibrillation which she is detecting by his mobile ECG recording device.  He had a high AF burden on the Holter monitor.  At this time he would prefer medical management rather than rescheduling the ablation procedure.  He has normal left ventricular function by echocardiogram, therefore a class Ic antiarrhythmic drug (flecainide) is recommended for AF suppression.  We will start at 50 mg twice daily.  I have asked Asher to continue to monitor his rhythm with the [place], then contact me with an update later this week so that we can adjust the flecainide and then plan another EKG.  Ablation is the preferred treatment for the this, but for now we will try to gain better control of the arrhythmia with an antiarrhythmic drug, then consider ablation later this year if necessary.  EP office follow-up will be in 8 to 10 " months.         Relevant Medications    flecainide (Tambocor) 50 mg tablet     James C Ramicone, DO

## 2025-03-17 ENCOUNTER — OFFICE VISIT (OUTPATIENT)
Dept: CARDIOLOGY | Facility: CLINIC | Age: 65
End: 2025-03-17
Payer: COMMERCIAL

## 2025-03-17 VITALS
SYSTOLIC BLOOD PRESSURE: 120 MMHG | OXYGEN SATURATION: 98 % | DIASTOLIC BLOOD PRESSURE: 80 MMHG | BODY MASS INDEX: 25.34 KG/M2 | HEIGHT: 71 IN | WEIGHT: 181 LBS | HEART RATE: 75 BPM

## 2025-03-17 DIAGNOSIS — I48.0 PAROXYSMAL ATRIAL FIBRILLATION (MULTI): Chronic | ICD-10-CM

## 2025-03-17 DIAGNOSIS — Z79.899 HIGH RISK MEDICATION USE: Primary | ICD-10-CM

## 2025-03-17 DIAGNOSIS — F41.9 ANXIETY: ICD-10-CM

## 2025-03-17 PROCEDURE — 3008F BODY MASS INDEX DOCD: CPT | Performed by: INTERNAL MEDICINE

## 2025-03-17 PROCEDURE — 99215 OFFICE O/P EST HI 40 MIN: CPT | Performed by: INTERNAL MEDICINE

## 2025-03-17 PROCEDURE — 1036F TOBACCO NON-USER: CPT | Performed by: INTERNAL MEDICINE

## 2025-03-17 RX ORDER — FLECAINIDE ACETATE 50 MG/1
50 TABLET ORAL 2 TIMES DAILY
Qty: 60 TABLET | Refills: 11 | Status: SHIPPED | OUTPATIENT
Start: 2025-03-17 | End: 2026-03-17

## 2025-03-17 RX ORDER — ESCITALOPRAM OXALATE 10 MG/1
20 TABLET ORAL DAILY
Status: SHIPPED
Start: 2025-03-17

## 2025-03-17 NOTE — ASSESSMENT & PLAN NOTE
No bleeding problems on Eliquis.  Flecainide is recommended for AF suppression.  Potential side effects discussed.  The dose of Lexapro that he is taking is 10 mg daily.

## 2025-03-17 NOTE — ASSESSMENT & PLAN NOTE
Asher is having frequent episodes of atrial fibrillation which she is detecting by his mobile ECG recording device.  He had a high AF burden on the Holter monitor.  At this time he would prefer medical management rather than rescheduling the ablation procedure.  He has normal left ventricular function by echocardiogram, therefore a class Ic antiarrhythmic drug (flecainide) is recommended for AF suppression.  We will start at 50 mg twice daily.  I have asked Asher to continue to monitor his rhythm with the Kardia, then contact me with an update later this week so that we can adjust the flecainide and then plan another EKG.  Ablation is the preferred treatment for the this, but for now we will try to gain better control of the arrhythmia with an antiarrhythmic drug, then consider ablation later this year if necessary.  EP office follow-up will be in 8 to 10 months.

## 2025-03-17 NOTE — PATIENT INSTRUCTIONS
Begin Flecainide 50 mg twice daily.    Call Dr Ramicone in 4-5 days with update on Afib episodes.    Follow up with Dr Ramicone in 8-10 months.

## 2025-03-21 ENCOUNTER — PATIENT MESSAGE (OUTPATIENT)
Dept: CARDIOLOGY | Facility: CLINIC | Age: 65
End: 2025-03-21
Payer: COMMERCIAL

## 2025-03-24 DIAGNOSIS — I48.0 PAROXYSMAL ATRIAL FIBRILLATION (MULTI): Chronic | ICD-10-CM

## 2025-03-25 DIAGNOSIS — I48.0 PAROXYSMAL ATRIAL FIBRILLATION (MULTI): Chronic | ICD-10-CM

## 2025-03-25 RX ORDER — FLECAINIDE ACETATE 100 MG/1
100 TABLET ORAL 2 TIMES DAILY
Qty: 60 TABLET | Refills: 11 | Status: SHIPPED | OUTPATIENT
Start: 2025-03-25 | End: 2026-03-25

## 2025-04-07 ENCOUNTER — PATIENT MESSAGE (OUTPATIENT)
Dept: CARDIOLOGY | Facility: CLINIC | Age: 65
End: 2025-04-07
Payer: COMMERCIAL

## 2025-04-07 DIAGNOSIS — I48.0 PAROXYSMAL ATRIAL FIBRILLATION (MULTI): Chronic | ICD-10-CM

## 2025-04-08 ENCOUNTER — HOSPITAL ENCOUNTER (OUTPATIENT)
Dept: CARDIOLOGY | Facility: CLINIC | Age: 65
Discharge: HOME | End: 2025-04-08
Payer: COMMERCIAL

## 2025-04-08 DIAGNOSIS — I48.0 PAROXYSMAL ATRIAL FIBRILLATION (MULTI): Chronic | ICD-10-CM

## 2025-04-08 PROCEDURE — 93225 XTRNL ECG REC<48 HRS REC: CPT

## 2025-04-09 DIAGNOSIS — I48.0 PAROXYSMAL ATRIAL FIBRILLATION (MULTI): Chronic | ICD-10-CM

## 2025-04-09 DIAGNOSIS — F41.9 ANXIETY: ICD-10-CM

## 2025-04-09 RX ORDER — ESCITALOPRAM OXALATE 10 MG/1
5 TABLET ORAL DAILY
Start: 2025-04-09

## 2025-04-09 RX ORDER — FLECAINIDE ACETATE 50 MG/1
100 TABLET ORAL 2 TIMES DAILY
Status: SHIPPED
Start: 2025-04-09 | End: 2025-04-11 | Stop reason: SDUPTHER

## 2025-04-09 RX ORDER — FLECAINIDE ACETATE 50 MG/1
50 TABLET ORAL 2 TIMES DAILY
Status: CANCELLED | COMMUNITY
Start: 2025-04-09

## 2025-04-10 ENCOUNTER — PATIENT MESSAGE (OUTPATIENT)
Dept: CARDIOLOGY | Facility: CLINIC | Age: 65
End: 2025-04-10
Payer: COMMERCIAL

## 2025-04-10 DIAGNOSIS — I48.0 PAROXYSMAL ATRIAL FIBRILLATION (MULTI): Chronic | ICD-10-CM

## 2025-04-11 RX ORDER — FLECAINIDE ACETATE 50 MG/1
50 TABLET ORAL 2 TIMES DAILY
Qty: 120 TABLET | Refills: 0 | Status: SHIPPED | OUTPATIENT
Start: 2025-04-11

## 2025-04-21 ENCOUNTER — PATIENT MESSAGE (OUTPATIENT)
Dept: CARDIOLOGY | Facility: CLINIC | Age: 65
End: 2025-04-21
Payer: COMMERCIAL

## 2025-04-24 ENCOUNTER — ANESTHESIA EVENT (OUTPATIENT)
Dept: CARDIOLOGY | Facility: HOSPITAL | Age: 65
End: 2025-04-24
Payer: COMMERCIAL

## 2025-04-25 ENCOUNTER — ANESTHESIA (OUTPATIENT)
Dept: CARDIOLOGY | Facility: HOSPITAL | Age: 65
End: 2025-04-25
Payer: COMMERCIAL

## 2025-04-25 ENCOUNTER — HOSPITAL ENCOUNTER (OUTPATIENT)
Dept: CARDIOLOGY | Facility: HOSPITAL | Age: 65
Discharge: HOME | End: 2025-04-25

## 2025-04-25 ENCOUNTER — HOSPITAL ENCOUNTER (OUTPATIENT)
Facility: HOSPITAL | Age: 65
Setting detail: OUTPATIENT SURGERY
Discharge: HOME | End: 2025-04-25
Attending: INTERNAL MEDICINE | Admitting: INTERNAL MEDICINE
Payer: COMMERCIAL

## 2025-04-25 VITALS
HEART RATE: 67 BPM | BODY MASS INDEX: 24.54 KG/M2 | DIASTOLIC BLOOD PRESSURE: 58 MMHG | TEMPERATURE: 97 F | OXYGEN SATURATION: 98 % | SYSTOLIC BLOOD PRESSURE: 98 MMHG | WEIGHT: 175.27 LBS | RESPIRATION RATE: 15 BRPM | HEIGHT: 71 IN

## 2025-04-25 DIAGNOSIS — F41.9 ANXIETY: ICD-10-CM

## 2025-04-25 DIAGNOSIS — I48.0 PAROXYSMAL ATRIAL FIBRILLATION (MULTI): ICD-10-CM

## 2025-04-25 LAB
ABO GROUP (TYPE) IN BLOOD: NORMAL
ABO GROUP (TYPE) IN BLOOD: NORMAL
ACT BLD: 306 SEC (ref 83–199)
ACT BLD: 363 SEC (ref 83–199)
ACT BLD: 395 SEC (ref 83–199)
ACT BLD: 397 SEC (ref 83–199)
ACT BLD: >397 SEC (ref 83–199)
ACT BLD: >397 SEC (ref 83–199)
ANION GAP SERPL CALC-SCNC: 12 MMOL/L (ref 10–20)
ANTIBODY SCREEN: NORMAL
BUN SERPL-MCNC: 14 MG/DL (ref 6–23)
CALCIUM SERPL-MCNC: 9.2 MG/DL (ref 8.6–10.3)
CHLORIDE SERPL-SCNC: 105 MMOL/L (ref 98–107)
CO2 SERPL-SCNC: 31 MMOL/L (ref 21–32)
CREAT SERPL-MCNC: 1.09 MG/DL (ref 0.5–1.3)
EGFRCR SERPLBLD CKD-EPI 2021: 76 ML/MIN/1.73M*2
ERYTHROCYTE [DISTWIDTH] IN BLOOD BY AUTOMATED COUNT: 13.6 % (ref 11.5–14.5)
GLUCOSE SERPL-MCNC: 87 MG/DL (ref 74–99)
HCT VFR BLD AUTO: 44.8 % (ref 41–52)
HGB BLD-MCNC: 14 G/DL (ref 13.5–17.5)
INR PPP: 1.1 (ref 0.9–1.1)
MCH RBC QN AUTO: 31 PG (ref 26–34)
MCHC RBC AUTO-ENTMCNC: 31.3 G/DL (ref 32–36)
MCV RBC AUTO: 99 FL (ref 80–100)
NRBC BLD-RTO: 0 /100 WBCS (ref 0–0)
PLATELET # BLD AUTO: 208 X10*3/UL (ref 150–450)
POTASSIUM SERPL-SCNC: 4 MMOL/L (ref 3.5–5.3)
PROTHROMBIN TIME: 11.9 SECONDS (ref 9.8–12.4)
RBC # BLD AUTO: 4.51 X10*6/UL (ref 4.5–5.9)
RH FACTOR (ANTIGEN D): NORMAL
RH FACTOR (ANTIGEN D): NORMAL
SODIUM SERPL-SCNC: 144 MMOL/L (ref 136–145)
WBC # BLD AUTO: 6.9 X10*3/UL (ref 4.4–11.3)

## 2025-04-25 PROCEDURE — 2500000004 HC RX 250 GENERAL PHARMACY W/ HCPCS (ALT 636 FOR OP/ED): Performed by: INTERNAL MEDICINE

## 2025-04-25 PROCEDURE — 7100000009 HC PHASE TWO TIME - INITIAL BASE CHARGE: Performed by: INTERNAL MEDICINE

## 2025-04-25 PROCEDURE — 85610 PROTHROMBIN TIME: CPT | Performed by: INTERNAL MEDICINE

## 2025-04-25 PROCEDURE — 7100000010 HC PHASE TWO TIME - EACH INCREMENTAL 1 MINUTE: Performed by: INTERNAL MEDICINE

## 2025-04-25 PROCEDURE — C1730 CATH, EP, 19 OR FEW ELECT: HCPCS | Performed by: INTERNAL MEDICINE

## 2025-04-25 PROCEDURE — 2550000001 HC RX 255 CONTRASTS: Performed by: INTERNAL MEDICINE

## 2025-04-25 PROCEDURE — 36415 COLL VENOUS BLD VENIPUNCTURE: CPT | Performed by: INTERNAL MEDICINE

## 2025-04-25 PROCEDURE — C1751 CATH, INF, PER/CENT/MIDLINE: HCPCS | Performed by: INTERNAL MEDICINE

## 2025-04-25 PROCEDURE — G0269 OCCLUSIVE DEVICE IN VEIN ART: HCPCS | Performed by: INTERNAL MEDICINE

## 2025-04-25 PROCEDURE — C1760 CLOSURE DEV, VASC: HCPCS | Performed by: INTERNAL MEDICINE

## 2025-04-25 PROCEDURE — 2500000004 HC RX 250 GENERAL PHARMACY W/ HCPCS (ALT 636 FOR OP/ED): Performed by: NURSE ANESTHETIST, CERTIFIED REGISTERED

## 2025-04-25 PROCEDURE — 3700000001 HC GENERAL ANESTHESIA TIME - INITIAL BASE CHARGE: Performed by: INTERNAL MEDICINE

## 2025-04-25 PROCEDURE — C1733 CATH, EP, OTHR THAN COOL-TIP: HCPCS | Performed by: INTERNAL MEDICINE

## 2025-04-25 PROCEDURE — 82374 ASSAY BLOOD CARBON DIOXIDE: CPT | Performed by: INTERNAL MEDICINE

## 2025-04-25 PROCEDURE — 93005 ELECTROCARDIOGRAM TRACING: CPT | Mod: 59

## 2025-04-25 PROCEDURE — 3700000002 HC GENERAL ANESTHESIA TIME - EACH INCREMENTAL 1 MINUTE: Performed by: INTERNAL MEDICINE

## 2025-04-25 PROCEDURE — C1731 CATH, EP, 20 OR MORE ELEC: HCPCS | Performed by: INTERNAL MEDICINE

## 2025-04-25 PROCEDURE — 2720000007 HC OR 272 NO HCPCS: Performed by: INTERNAL MEDICINE

## 2025-04-25 PROCEDURE — 93656 COMPRE EP EVAL ABLTJ ATR FIB: CPT | Performed by: INTERNAL MEDICINE

## 2025-04-25 PROCEDURE — 93655 ICAR CATH ABLTJ DSCRT ARRHYT: CPT | Performed by: INTERNAL MEDICINE

## 2025-04-25 PROCEDURE — C1766 INTRO/SHEATH,STRBLE,NON-PEEL: HCPCS | Performed by: INTERNAL MEDICINE

## 2025-04-25 PROCEDURE — 92960 CARDIOVERSION ELECTRIC EXT: CPT | Mod: 59 | Performed by: INTERNAL MEDICINE

## 2025-04-25 PROCEDURE — 7100000002 HC RECOVERY ROOM TIME - EACH INCREMENTAL 1 MINUTE: Performed by: INTERNAL MEDICINE

## 2025-04-25 PROCEDURE — 7100000001 HC RECOVERY ROOM TIME - INITIAL BASE CHARGE: Performed by: INTERNAL MEDICINE

## 2025-04-25 PROCEDURE — 2780000003 HC OR 278 NO HCPCS: Performed by: INTERNAL MEDICINE

## 2025-04-25 PROCEDURE — C1759 CATH, INTRA ECHOCARDIOGRAPHY: HCPCS | Performed by: INTERNAL MEDICINE

## 2025-04-25 PROCEDURE — 86901 BLOOD TYPING SEROLOGIC RH(D): CPT | Performed by: INTERNAL MEDICINE

## 2025-04-25 PROCEDURE — 93010 ELECTROCARDIOGRAM REPORT: CPT | Performed by: INTERNAL MEDICINE

## 2025-04-25 PROCEDURE — C1894 INTRO/SHEATH, NON-LASER: HCPCS | Performed by: INTERNAL MEDICINE

## 2025-04-25 PROCEDURE — 85347 COAGULATION TIME ACTIVATED: CPT

## 2025-04-25 PROCEDURE — 85027 COMPLETE CBC AUTOMATED: CPT | Performed by: INTERNAL MEDICINE

## 2025-04-25 RX ORDER — LIDOCAINE HCL/PF 100 MG/5ML
SYRINGE (ML) INTRAVENOUS AS NEEDED
Status: DISCONTINUED | OUTPATIENT
Start: 2025-04-25 | End: 2025-04-25

## 2025-04-25 RX ORDER — PHENYLEPHRINE 10 MG/250 ML(40 MCG/ML)IN 0.9 % SOD.CHLORIDE INTRAVENOUS
CONTINUOUS PRN
Status: DISCONTINUED | OUTPATIENT
Start: 2025-04-25 | End: 2025-04-25

## 2025-04-25 RX ORDER — PROTAMINE SULFATE 10 MG/ML
INJECTION, SOLUTION INTRAVENOUS AS NEEDED
Status: DISCONTINUED | OUTPATIENT
Start: 2025-04-25 | End: 2025-04-25

## 2025-04-25 RX ORDER — ALBUTEROL SULFATE 0.83 MG/ML
2.5 SOLUTION RESPIRATORY (INHALATION) ONCE AS NEEDED
Status: DISCONTINUED | OUTPATIENT
Start: 2025-04-25 | End: 2025-04-25 | Stop reason: HOSPADM

## 2025-04-25 RX ORDER — DEXAMETHASONE SODIUM PHOSPHATE 10 MG/ML
6 INJECTION INTRAMUSCULAR; INTRAVENOUS ONCE
Status: DISCONTINUED | OUTPATIENT
Start: 2025-04-25 | End: 2025-04-25 | Stop reason: HOSPADM

## 2025-04-25 RX ORDER — HEPARIN SODIUM 10000 [USP'U]/100ML
INJECTION, SOLUTION INTRAVENOUS CONTINUOUS PRN
Status: DISCONTINUED | OUTPATIENT
Start: 2025-04-25 | End: 2025-04-25 | Stop reason: HOSPADM

## 2025-04-25 RX ORDER — IPRATROPIUM BROMIDE 0.5 MG/2.5ML
500 SOLUTION RESPIRATORY (INHALATION) ONCE
Status: DISCONTINUED | OUTPATIENT
Start: 2025-04-25 | End: 2025-04-25 | Stop reason: HOSPADM

## 2025-04-25 RX ORDER — SODIUM CHLORIDE, SODIUM LACTATE, POTASSIUM CHLORIDE, CALCIUM CHLORIDE 600; 310; 30; 20 MG/100ML; MG/100ML; MG/100ML; MG/100ML
100 INJECTION, SOLUTION INTRAVENOUS CONTINUOUS
Status: ACTIVE | OUTPATIENT
Start: 2025-04-25 | End: 2025-04-25

## 2025-04-25 RX ORDER — PHENYLEPHRINE HCL IN 0.9% NACL 1 MG/10 ML
SYRINGE (ML) INTRAVENOUS AS NEEDED
Status: DISCONTINUED | OUTPATIENT
Start: 2025-04-25 | End: 2025-04-25

## 2025-04-25 RX ORDER — LIDOCAINE HYDROCHLORIDE 20 MG/ML
INJECTION, SOLUTION INFILTRATION; PERINEURAL AS NEEDED
Status: DISCONTINUED | OUTPATIENT
Start: 2025-04-25 | End: 2025-04-25 | Stop reason: HOSPADM

## 2025-04-25 RX ORDER — SODIUM CHLORIDE 9 MG/ML
INJECTION, SOLUTION INTRAVENOUS CONTINUOUS PRN
Status: DISCONTINUED | OUTPATIENT
Start: 2025-04-25 | End: 2025-04-25 | Stop reason: HOSPADM

## 2025-04-25 RX ORDER — ACETAMINOPHEN 325 MG/1
650 TABLET ORAL EVERY 4 HOURS PRN
Status: DISCONTINUED | OUTPATIENT
Start: 2025-04-25 | End: 2025-04-25 | Stop reason: HOSPADM

## 2025-04-25 RX ORDER — HEPARIN SODIUM 1000 [USP'U]/ML
INJECTION, SOLUTION INTRAVENOUS; SUBCUTANEOUS AS NEEDED
Status: DISCONTINUED | OUTPATIENT
Start: 2025-04-25 | End: 2025-04-25 | Stop reason: HOSPADM

## 2025-04-25 RX ORDER — FENTANYL CITRATE 50 UG/ML
INJECTION, SOLUTION INTRAMUSCULAR; INTRAVENOUS AS NEEDED
Status: DISCONTINUED | OUTPATIENT
Start: 2025-04-25 | End: 2025-04-25

## 2025-04-25 RX ORDER — ESCITALOPRAM OXALATE 10 MG/1
10 TABLET ORAL DAILY
Start: 2025-04-25

## 2025-04-25 RX ORDER — PROPOFOL 10 MG/ML
INJECTION, EMULSION INTRAVENOUS AS NEEDED
Status: DISCONTINUED | OUTPATIENT
Start: 2025-04-25 | End: 2025-04-25

## 2025-04-25 RX ORDER — ONDANSETRON HYDROCHLORIDE 2 MG/ML
4 INJECTION, SOLUTION INTRAVENOUS ONCE AS NEEDED
Status: DISCONTINUED | OUTPATIENT
Start: 2025-04-25 | End: 2025-04-25 | Stop reason: HOSPADM

## 2025-04-25 RX ORDER — HYDROMORPHONE HYDROCHLORIDE 0.2 MG/ML
0.2 INJECTION INTRAMUSCULAR; INTRAVENOUS; SUBCUTANEOUS EVERY 5 MIN PRN
Status: DISCONTINUED | OUTPATIENT
Start: 2025-04-25 | End: 2025-04-25 | Stop reason: HOSPADM

## 2025-04-25 RX ORDER — ONDANSETRON HYDROCHLORIDE 2 MG/ML
INJECTION, SOLUTION INTRAVENOUS AS NEEDED
Status: DISCONTINUED | OUTPATIENT
Start: 2025-04-25 | End: 2025-04-25

## 2025-04-25 RX ORDER — MIDAZOLAM HYDROCHLORIDE 1 MG/ML
INJECTION, SOLUTION INTRAMUSCULAR; INTRAVENOUS AS NEEDED
Status: DISCONTINUED | OUTPATIENT
Start: 2025-04-25 | End: 2025-04-25

## 2025-04-25 RX ORDER — LIDOCAINE HYDROCHLORIDE 10 MG/ML
0.1 INJECTION, SOLUTION EPIDURAL; INFILTRATION; INTRACAUDAL; PERINEURAL ONCE
Status: DISCONTINUED | OUTPATIENT
Start: 2025-04-25 | End: 2025-04-25 | Stop reason: HOSPADM

## 2025-04-25 RX ORDER — ROCURONIUM BROMIDE 10 MG/ML
INJECTION, SOLUTION INTRAVENOUS AS NEEDED
Status: DISCONTINUED | OUTPATIENT
Start: 2025-04-25 | End: 2025-04-25

## 2025-04-25 RX ADMIN — Medication 100 MCG: at 09:43

## 2025-04-25 RX ADMIN — Medication 100 MCG: at 09:22

## 2025-04-25 RX ADMIN — SUGAMMADEX 200 MG: 100 INJECTION, SOLUTION INTRAVENOUS at 11:25

## 2025-04-25 RX ADMIN — SODIUM CHLORIDE, POTASSIUM CHLORIDE, SODIUM LACTATE AND CALCIUM CHLORIDE: 600; 310; 30; 20 INJECTION, SOLUTION INTRAVENOUS at 07:40

## 2025-04-25 RX ADMIN — MIDAZOLAM 2 MG: 1 INJECTION INTRAMUSCULAR; INTRAVENOUS at 07:50

## 2025-04-25 RX ADMIN — Medication 0.1 MCG/KG/MIN: at 08:20

## 2025-04-25 RX ADMIN — Medication 200 MCG: at 08:27

## 2025-04-25 RX ADMIN — Medication 100 MCG: at 11:44

## 2025-04-25 RX ADMIN — LIDOCAINE HYDROCHLORIDE 60 MG: 20 INJECTION, SOLUTION INTRAVENOUS at 07:58

## 2025-04-25 RX ADMIN — PROPOFOL 120 MG: 10 INJECTION, EMULSION INTRAVENOUS at 07:58

## 2025-04-25 RX ADMIN — FENTANYL CITRATE 100 MCG: 50 INJECTION, SOLUTION INTRAMUSCULAR; INTRAVENOUS at 07:58

## 2025-04-25 RX ADMIN — DEXAMETHASONE SODIUM PHOSPHATE 4 MG: 4 INJECTION, SOLUTION INTRAMUSCULAR; INTRAVENOUS at 08:06

## 2025-04-25 RX ADMIN — Medication 200 MCG: at 09:16

## 2025-04-25 RX ADMIN — PROTAMINE SULFATE 50 MG: 10 INJECTION, SOLUTION INTRAVENOUS at 11:13

## 2025-04-25 RX ADMIN — ROCURONIUM BROMIDE 50 MG: 50 INJECTION, SOLUTION INTRAVENOUS at 07:59

## 2025-04-25 RX ADMIN — ONDANSETRON 4 MG: 2 INJECTION, SOLUTION INTRAMUSCULAR; INTRAVENOUS at 11:13

## 2025-04-25 RX ADMIN — Medication 200 MCG: at 08:12

## 2025-04-25 SDOH — HEALTH STABILITY: MENTAL HEALTH: CURRENT SMOKER: 0

## 2025-04-25 ASSESSMENT — PAIN SCALES - GENERAL
PAINLEVEL_OUTOF10: 0 - NO PAIN
PAIN_LEVEL: 0
PAINLEVEL_OUTOF10: 0 - NO PAIN

## 2025-04-25 ASSESSMENT — COLUMBIA-SUICIDE SEVERITY RATING SCALE - C-SSRS
6. HAVE YOU EVER DONE ANYTHING, STARTED TO DO ANYTHING, OR PREPARED TO DO ANYTHING TO END YOUR LIFE?: NO
2. HAVE YOU ACTUALLY HAD ANY THOUGHTS OF KILLING YOURSELF?: NO
1. IN THE PAST MONTH, HAVE YOU WISHED YOU WERE DEAD OR WISHED YOU COULD GO TO SLEEP AND NOT WAKE UP?: NO

## 2025-04-25 ASSESSMENT — PAIN - FUNCTIONAL ASSESSMENT
PAIN_FUNCTIONAL_ASSESSMENT: 0-10

## 2025-04-25 NOTE — Clinical Note
Closure device placed in the left femoral vein. Site closed by Tegaderm. Deployed By: James C Ramicone, DO

## 2025-04-25 NOTE — H&P
"  History Of Present Illness:      This is a 64-year-old male with a history of atrial fibrillation.  He presents today for pulmonary vein isolation.  He has been experiencing episodes of atrial fibrillation on flecainide.  Today he is in atrial flutter with variable A-V conduction.    Asher was initially seen in consultation on November 1, 2024 at the request of Dr. Tyree Box.  The patient reports having had a TIA in September.  Initially his symptoms were dizziness, then he had transient vision loss involving part of his right sided visual field.  The vision recovered but at that time he was not sure if this was a retinal issue since he had a history of retinal detachment.  Subsequent evaluation during an echocardiogram yielded atrial fibrillation and he has been on Eliquis since October 3, 2024.  The atrial fibrillation was identified on October 2, 2024 at the time of an echocardiogram.  He has been feeling fatigued and somewhat short of breath at times on exertion.     Past medical history:     Hypothyroidism  Retinal detachment  Right vertebral artery stenosis  Paroxysmal atrial fibrillation  TIA Sept 2024     Past surgical history: Pyloric stenosis at 6 months of age     Social history: Non-smoker, occasional alcohol use     Family history: Positive for CAD    Review of Systems    Other review of systems negative    Social History:  He reports that he has never smoked. He has never used smokeless tobacco. He reports current alcohol use of about 4.0 - 5.0 standard drinks of alcohol per week. He reports that he does not use drugs.    Family History:  Family History[1]     Allergies:  Patient has no known allergies.    Medications:  Current Medications[2]      Last Recorded Vitals:  Vitals:    04/25/25 0655 04/25/25 0713   BP:  116/74   Pulse:  (!) 117   Resp:  16   Temp:  36.4 °C (97.5 °F)   SpO2:  98%   Weight: 79.5 kg (175 lb 4.3 oz)    Height: 1.803 m (5' 11\")        Physical Exam:    General " "Appearance:  Alert, oriented, no distress  Skin:  Warm and dry  Head and Neck:  No elevation of JVP, no carotid bruits  Cardiac Exam:  Rhythm is regular, S1 and S2 are normal, no murmur S3 or S4  Lungs:  Clear to auscultation  Extremities:  no edema  Neurologic:  No focal deficits  Psychiatric:  Appropriate mood and behavior    Laboratory data:    CMP:  Recent Labs     12/10/24  0857 07/30/24  0924 11/13/23  0844 11/07/22  0842 11/01/21  1100 10/23/20  0839 10/28/19  1055 10/15/19  1022    140 138 142 138 140 139 141   K 4.7 4.6 4.1 4.6 4.4 4.5 4.7 5.5*    103 101 102 102 101 102 103   CO2 31 30 34* 34* 32 30 32 32   ANIONGAP 11 12 7* 11 8* 14 10 12   BUN 20 13 14 15 13 13 17 19   CREATININE 1.11 1.07 0.99 1.00 1.04 1.02 1.00 1.03   EGFR 75 78 86  --   --   --   --   --      Recent Labs     12/10/24  0857 07/30/24  0924 11/13/23  0844 11/07/22  0842 11/01/21  1100   ALBUMIN 4.4 4.6 4.2 4.3 4.4   ALKPHOS 131 77 74 66 73   * 20 24 22 22   AST 85* 27 31 29 33   BILITOT 2.1* 1.6* 1.2 1.6* 2.1*     CBC:  Recent Labs     04/25/25  0711 12/10/24  0857 07/30/24  0924 11/13/23  0844 11/07/22  0842 11/01/21  1100 10/23/20  0839 10/15/19  1022   WBC 6.9 6.6 6.4 5.1 5.1 4.6 3.6* 5.0   HGB 14.0 13.5 15.4 14.7 14.3 15.8 14.6 15.0   HCT 44.8 42.7 47.6 45.3 44.4 48.8 46.6 47.2    214 282 298 261 257 285 234   MCV 99 101* 99 100 100 99 102* 98     COAG:   Recent Labs     04/25/25  0711   INR 1.1     HEME/ENDO:  Recent Labs     12/10/24  0857 10/02/24  0825 07/30/24  0924 11/13/23  0844 11/07/22  0842   TSH 0.70  --  2.90 1.33 1.61   HGBA1C 6.2* 5.8*  --  5.5 5.8*      CARDIAC: No results for input(s): \"LDH\", \"CKMB\", \"TROPHS\", \"BNP\" in the last 70219 hours.    No lab exists for component: \"CK\", \"CKMBP\"  Recent Labs     12/10/24  0857 11/13/23  0844   CHOL 106 174   HDL 59.4 75.4   TRIG 52 62       Test Review:  I have personally review the diagnostic testing:  Atrial flutter with variable AV " conduction    Assessment/Plan:    1.  Atrial fibrillation and atrial flutter: The patient will undergo pulmonary vein isolation using cryoablation.  Today he is in atrial flutter and therefore we will also perform RF ablation for treatment of right atrial flutter.  The ablation procedure and risks were discussed in detail with the patient and his wife.  Consent was obtained.      James C Ramicone, DO         [1]   Family History  Problem Relation Name Age of Onset    Lung cancer Mother      Other (carotid blockage) Father      Hyperlipidemia Father     [2]   No current facility-administered medications for this encounter.

## 2025-04-25 NOTE — ANESTHESIA PROCEDURE NOTES
Airway  Date/Time: 4/25/2025 8:02 AM  Reason: elective    Airway not difficult    Staffing  Performed: LUZ   Authorized by: Jesus Decker MD    Performed by: ARMANI Frankel-MICHA  Patient location during procedure: OR    Patient Condition  Indications for airway management: anesthesia  Patient position: sniffing  Sedation level: deep     Final Airway Details   Preoxygenated: yes  Final airway type: endotracheal airway  Successful airway: ETT  Cuffed: yes   Successful intubation technique: direct laryngoscopy  Adjuncts used in placement: intubating stylet and anterior pressure/BURP  Endotracheal tube insertion site: oral  Blade: Bernadine  Blade size: #4  ETT size (mm): 7.5  Cormack-Lehane Classification: grade I - full view of glottis  Placement verified by: chest auscultation and capnometry   Measured from: lips  ETT to lips (cm): 22  Number of attempts at approach: 1

## 2025-04-25 NOTE — ANESTHESIA PROCEDURE NOTES
Arterial Line:    Date/Time: 4/25/2025 8:15 AM    Staffing  Performed: SRNA   Authorized by: Jesus Decker MD    Performed by: IDANIA Frankel    An arterial line was placed. Procedure performed using surface landmarks.in the OR for the following indication(s): continuous blood pressure monitoring and blood sampling needed.    A 20 gauge (size), 1 and 1/4 inch (length), Arrow (type) catheter was placed into the Right radial artery, secured by TegadermEvents:  patient tolerated procedure well with no complications and under GA.

## 2025-04-25 NOTE — Clinical Note
Accessed site: right femoral vein.   Ultrasound guidance was used. 4 [place] citizen micropuncture. Wire advanced followed by 4 [place] citizen sheath, 0.035 J wire advanced and sheath removed

## 2025-04-25 NOTE — POST-PROCEDURE NOTE
Physician Transition of Care Summary  Invasive Cardiovascular Lab    Procedure Date: 4/25/2025  Attending:    * James C Ramicone - Primary  Resident/Fellow/Other Assistant: Surgeons and Role:  * No surgeons found with a matching role *    Indications:   Pre-op Diagnosis      * Paroxysmal atrial fibrillation (Multi) [I48.0]    Post-procedure diagnosis:   Post-op Diagnosis     * Paroxysmal atrial fibrillation (Multi) [I48.0]    Procedure(s):   ABLATION A-FIB CRYO CPT 87504  38182 - [place] COMPRE EP EVAL ABLTJ ATR FIB PULM VEIN ISOLATION        Procedure Findings:       Description of the Procedure:   Pulmonary vein isolation for treatment of symptomatic paroxysmal atrial fibrillation.  RF ablation for atrial flutter    Complications:   None    Stents/Implants:   Implants       No implant documentation for this case.            Anticoagulation/Antiplatelet Plan:   Continue uninterrupted anticoagulation    Estimated Blood Loss:   22 mL    Anesthesia: General Anesthesia Staff: Anesthesiologist: Jesus Decker MD  CRNA: Danitza Daniels APRN-CRNA  SRNA: Denise Verb    Any Specimen(s) Removed:   Order Name Source Comment Collection Info Order Time   CBC Blood, Venous  Collected By: Magali High RN 4/25/2025  7:00 AM     Release result to [employer]   Immediate        BASIC METABOLIC PANEL Blood, Venous  Collected By: Magali High RN 4/25/2025  7:00 AM     Release result to [facility]   Immediate        PROTIME-INR Blood, Venous  Collected By: Magali High RN 4/25/2025  7:00 AM     Release result to [facility]   Immediate        TYPE AND SCREEN Blood, Venous  Collected By: Magali High RN 4/25/2025  7:00 AM     Release result to [facility]   Immediate        VERAB/VERIFY ABORH Blood, Venous **This is for confirming/verifying history of ABORh on file for transfusion of blood products. If this is not for transfusion, please order an ABO/RH [[id number]]. If you have any questions or unsure what to order, please call the blood bank.**  Collected By: Magali High RN 4/25/2025  7:00 AM     Release result to [facility]   Immediate            Disposition:   Planning for same-day discharge      Electronically signed by: James C Ramicone, DO, 4/25/2025 11:34 AM

## 2025-04-25 NOTE — Clinical Note
Sheath was exchanged in the right femoral vein with INTRODUCER, CATHETER, HEMOSTASIS, FAST-CATH, 12 FR X 12 CM.

## 2025-04-25 NOTE — Clinical Note
Accessed site: left femoral vein.   Ultrasound guidance was used. 4 [place] micropuncture. Wire advanced followed by 4 [place] sheath, 0.035 J wire advanced and sheath removed

## 2025-04-25 NOTE — Clinical Note
Closure device placed in the right femoral vein. Site closed by VASCADE. Deployed By: James C Ramicone, DO XL

## 2025-04-25 NOTE — Clinical Note
Accessed site: left femoral vein.   Ultrasound guidance was used. 4 [place] micropuncture. Wire advanced followed by 4 [place] sheath, 0.035 J wire advanced and sheath removed

## 2025-04-25 NOTE — NURSING NOTE
Discharge instructions given. Patient verbalized understanding. Bilateral groin sites stable with no signs of bleeding or hematoma. VSS with no complaints of pain. All belongings returned, PIV removed and patient stable for discharge home.

## 2025-04-25 NOTE — ANESTHESIA POSTPROCEDURE EVALUATION
Patient: Asher Bernabe    Procedure Summary       Date: 04/25/25 Room / Location: [facility] EP [facility] / [facility]    Anesthesia Start: 0740 Anesthesia Stop:     Procedure: ABLATION A-FIB CRYO CPT 09392 (Bilateral: Groin) Diagnosis:       Paroxysmal atrial fibrillation (Multi)      (Paroxysmal atrial fibrillation  Typical atrial flutter)    Providers: James C Ramicone, DO Responsible Provider: Jesus Decker MD    Anesthesia Type: general ASA Status: 3            Anesthesia Type: general    Vitals Value Taken Time   BP 87/55 04/25/25 11:42   Temp 36 04/25/25 11:42   Pulse 61 04/25/25 11:41   Resp 16 04/25/25 11:42   SpO2 100 % 04/25/25 11:41   Vitals shown include unfiled device data.    Anesthesia Post Evaluation    Patient location during evaluation: PACU  Patient participation: complete - patient participated  Level of consciousness: sleepy but conscious  Pain score: 0  Pain management: adequate  Airway patency: patent  Cardiovascular status: acceptable and hemodynamically stable  Respiratory status: acceptable and face mask  Hydration status: acceptable  Postoperative Nausea and Vomiting: none        There were no known notable events for this encounter.

## 2025-04-25 NOTE — ANESTHESIA PREPROCEDURE EVALUATION
Patient: Asher Bernabe    Procedure Information       Date/Time: 04/25/25 0800    Procedure: ABLATION A-FIB CRYO CPT 06254 - 8:00 am, CPT 59004, 240 min duration    Location: [facility] / [facility]    Providers: James C Ramicone, DO            Relevant Problems   Cardiac   (+) Atrial flutter (Multi)   (+) Paroxysmal atrial fibrillation (Multi)      Neuro   (+) Anxiety   (+) TIA (transient ischemic attack)      Endocrine   (+) Hypothyroidism       Clinical information reviewed:      Problems              NPO Detail:  No data recorded     Physical Exam    Airway  Mallampati: I  TM distance: >3 FB  Neck ROM: full  Mouth opening: 3 or more finger widths     Cardiovascular   Rhythm: irregular  Rate: abnormal     Dental - normal exam     Pulmonary - normal exam   Abdominal            Anesthesia Plan    History of general anesthesia?: unknown/emergency  History of complications of general anesthesia?: unknown/emergency    ASA 3     general   (A-LINE PLACEMENT)  The patient is not a current smoker.    intravenous induction   Postoperative pain plan includes opioids.  Trial extubation is planned.  Anesthetic plan and risks discussed with patient.  Use of blood products discussed with patient who.    Plan discussed with CRNA.

## 2025-04-30 LAB
ATRIAL RATE: 256 BPM
P AXIS: 266 DEGREES
P OFFSET: 217 MS
P ONSET: 163 MS
Q ONSET: 225 MS
QRS COUNT: 20 BEATS
QRS DURATION: 84 MS
QT INTERVAL: 334 MS
QTC CALCULATION(BAZETT): 465 MS
QTC FREDERICIA: 417 MS
R AXIS: 19 DEGREES
T AXIS: -45 DEGREES
T OFFSET: 392 MS
VENTRICULAR RATE: 117 BPM

## 2025-05-23 ENCOUNTER — APPOINTMENT (OUTPATIENT)
Dept: CARDIOLOGY | Facility: CLINIC | Age: 65
End: 2025-05-23
Payer: COMMERCIAL

## 2025-05-27 ENCOUNTER — OFFICE VISIT (OUTPATIENT)
Dept: CARDIOLOGY | Facility: CLINIC | Age: 65
End: 2025-05-27
Payer: COMMERCIAL

## 2025-05-27 VITALS
DIASTOLIC BLOOD PRESSURE: 66 MMHG | OXYGEN SATURATION: 97 % | HEIGHT: 71 IN | BODY MASS INDEX: 24.39 KG/M2 | HEART RATE: 68 BPM | WEIGHT: 174.2 LBS | SYSTOLIC BLOOD PRESSURE: 114 MMHG

## 2025-05-27 DIAGNOSIS — Z79.899 HIGH RISK MEDICATION USE: ICD-10-CM

## 2025-05-27 DIAGNOSIS — E78.2 MIXED HYPERLIPIDEMIA: ICD-10-CM

## 2025-05-27 DIAGNOSIS — I48.0 PAROXYSMAL ATRIAL FIBRILLATION (MULTI): Primary | ICD-10-CM

## 2025-05-27 DIAGNOSIS — I48.91 ATRIAL FIBRILLATION, UNSPECIFIED TYPE (MULTI): ICD-10-CM

## 2025-05-27 DIAGNOSIS — I48.3 TYPICAL ATRIAL FLUTTER (MULTI): ICD-10-CM

## 2025-05-27 DIAGNOSIS — G45.9 TIA (TRANSIENT ISCHEMIC ATTACK): ICD-10-CM

## 2025-05-27 DIAGNOSIS — M79.10 MYALGIA: ICD-10-CM

## 2025-05-27 DIAGNOSIS — I65.01 STENOSIS OF RIGHT VERTEBRAL ARTERY: ICD-10-CM

## 2025-05-27 LAB
ATRIAL RATE: 63 BPM
P AXIS: 71 DEGREES
P OFFSET: 206 MS
P ONSET: 143 MS
PR INTERVAL: 164 MS
Q ONSET: 225 MS
QRS COUNT: 10 BEATS
QRS DURATION: 88 MS
QT INTERVAL: 384 MS
QTC CALCULATION(BAZETT): 392 MS
QTC FREDERICIA: 390 MS
R AXIS: 48 DEGREES
T AXIS: 57 DEGREES
T OFFSET: 417 MS
VENTRICULAR RATE: 63 BPM

## 2025-05-27 PROCEDURE — 99212 OFFICE O/P EST SF 10 MIN: CPT

## 2025-05-27 PROCEDURE — 3008F BODY MASS INDEX DOCD: CPT

## 2025-05-27 PROCEDURE — 93010 ELECTROCARDIOGRAM REPORT: CPT | Performed by: INTERNAL MEDICINE

## 2025-05-27 PROCEDURE — 1036F TOBACCO NON-USER: CPT

## 2025-05-27 PROCEDURE — 93005 ELECTROCARDIOGRAM TRACING: CPT

## 2025-05-27 PROCEDURE — 99215 OFFICE O/P EST HI 40 MIN: CPT

## 2025-05-27 RX ORDER — ATORVASTATIN CALCIUM 20 MG/1
20 TABLET, FILM COATED ORAL NIGHTLY
Qty: 90 TABLET | Refills: 1 | Status: SHIPPED | OUTPATIENT
Start: 2025-05-27 | End: 2025-11-23

## 2025-05-27 NOTE — PATIENT INSTRUCTIONS
Cut down atorvastatin to 20mg daily at bedtime.  Take CoQ10.    Fasting labs in 3 months.     Follow up with Dr. Ramicone in

## 2025-05-27 NOTE — PROGRESS NOTES
"Chief Complaint:   Follow-up s/p ablation     History Of Present Illness:    Asher Bernabe is a 64 y.o. male with PMHx of paroxysmal A-fib/flutter, anxiety, hypothyroidism, TIA, and GERD presenting today for follow-up s/p cryoablation on 4/25/2025 with Dr. Ramicone.  Patient reports that he has been feeling well since his ablation and denies any recurrences of atrial fibrillation.  He reports his symptoms of fatigue and shortness of breath have resolved.  He did develop shortness of breath while being on flecainide and once this was discontinued he reports this resolved.  He does however tell me that he developed myalgias ever since starting atorvastatin 40 mg daily and generalized body aches.  He also reports having some issues with insomnia. He denies any CP, SOB, palpitations, lightheadedness, syncope, orthopnea, PND, lower extremity edema.  Patient tells me he is very physically active, he plays competitive volleyball.     Last Recorded Vitals:  Vitals:    05/27/25 0831   BP: 114/66   BP Location: Left arm   Patient Position: Sitting   BP Cuff Size: Adult   Pulse: 68   SpO2: 97%   Weight: 79 kg (174 lb 3.2 oz)   Height: 1.803 m (5' 11\")     Past Medical History:  He has a past medical history of Acute bronchitis with bronchospasm (08/22/2023), Acute pharyngitis (08/22/2023), Acute sinusitis (08/22/2023), Atrial flutter (Multi) (11/01/2024), Leg abrasion (08/22/2023), Lip swelling (08/22/2023), Paroxysmal atrial fibrillation (Multi) (10/31/2024), Retinal tear of right eye, and Sinus congestion (08/22/2023).    Past Surgical History:  He has a past surgical history that includes Cardiac electrophysiology procedure (Bilateral, 4/25/2025).      Social History:  He reports that he has never smoked. He has never used smokeless tobacco. He reports current alcohol use of about 4.0 - 5.0 standard drinks of alcohol per week. He reports that he does not use drugs.    Family History:  Family History[1]   "   Allergies:  Patient has no known allergies.    Outpatient Medications:  Current Outpatient Medications   Medication Instructions    apixaban (ELIQUIS) 5 mg, oral, 2 times daily    atorvastatin (LIPITOR) 20 mg, oral, Nightly    bisoprolol (ZEBETA) 5 mg, oral, Daily    escitalopram (LEXAPRO) 10 mg, oral, Daily    famotidine (Pepcid) 20 mg tablet 1 tablet, Daily    levothyroxine (SYNTHROID, LEVOXYL) 112 mcg, oral, Daily     Physical Exam:  General: no acute distress  HEENT: EOMI, no scleral icterus.  Lungs: Clear to auscultation bilaterally without wheezing, rales, or rhonchi.  Cardiovascular: Regular rhythm and rate. Normal S1 and S2. No murmurs, rubs, or gallops are appreciated. JVP normal.  Abdomen: Soft, nontender, nondistended. Bowel sounds present.  Extremities: Warm and well perfused with equal 2+ pulses bilaterally.  No edema.  Neurologic: Alert and oriented x3.      Last Labs:  CBC -  Lab Results   Component Value Date    WBC 6.9 04/25/2025    HGB 14.0 04/25/2025    HCT 44.8 04/25/2025    MCV 99 04/25/2025     04/25/2025     CMP -  Lab Results   Component Value Date    CALCIUM 9.2 04/25/2025    PROT 7.0 12/10/2024    ALBUMIN 4.4 12/10/2024    AST 85 (H) 12/10/2024     (H) 12/10/2024    ALKPHOS 131 12/10/2024    BILITOT 2.1 (H) 12/10/2024     LIPID PANEL -   Lab Results   Component Value Date    CHOL 106 12/10/2024    TRIG 52 12/10/2024    HDL 59.4 12/10/2024    CHHDL 1.8 12/10/2024    VLDL 10 12/10/2024    NHDL 47 12/10/2024     RENAL FUNCTION PANEL -   Lab Results   Component Value Date    GLUCOSE 87 04/25/2025     04/25/2025    K 4.0 04/25/2025     04/25/2025    CO2 31 04/25/2025    ANIONGAP 12 04/25/2025    BUN 14 04/25/2025    CREATININE 1.09 04/25/2025    GFRMALE 85 11/07/2022    CALCIUM 9.2 04/25/2025    ALBUMIN 4.4 12/10/2024      Lab Results   Component Value Date    HGBA1C 6.2 (H) 12/10/2024     Last Cardiology Tests:  ECG:  ECG 12 Lead 5/27/2025  NSR with sinus arrhythmia  "and ventricular rate of 63 bpm.    Echo:  No results found for this or any previous visit from the past 1095 days.    Ejection Fractions:  No results found for: \"EF\"    Cath:  No results found for this or any previous visit from the past 1095 days.    Stress Test:  No results found for this or any previous visit from the past 1095 days.    Cardiac Imaging:  Holter monitor 4/8/2025  The predominant rhythm during this [place] recording is atrial flutter with minimum heart rate of 50 bpm, max heart rate of 244 bpm, and average heart rate of 111 bpm.    Holter monitor 11/01/2024  The predominant rhythm during this holter recording is sinus rhythm with a minimum heart rate of 34 bpm, maximum heart rate 96 bpm, and average heart rate 101 bpm.   Paroxysmal atrial fibrillation is present. The total AF burden was 76% .   No episodes of high grade AV block.   Frequent PVCs with nonsustained ventricular tachycardia 3 beats in duration.    I have personally reviewed most recent PCP, cardiology, vascular, studies and/or documentation.      Assessment/Plan   Paroxysmal A-fib/flutter, he was initially noted to be in A-fib in October 2024 after TIA. He is now s/p cryoablation on 4/25/2025 with Dr. Ramicone. He was having SOB on flecainide and this was discontinued and instead he is started on bisoprolol 5mg daily. EKG in office today showing NSR with sinus arrhythmia.  She denies any further recurrence of A-fib and reports his symptoms improved. Continue Eliquis and bisoprolol.    HLD, 12/10/2024 LDL 36, HDL 59.4, triglycerides 52.  He is on atorvastatin 40 mg daily. He reports having generalized myalgias and body aches.  Most recent liver function tests were slightly elevated AST 55 and ALT 69 (1/30/2025). Will cut down atorvastatin to 20 mg.  He is also okay to take co-Q10 with a statin. Repeat fasting labs in 3 months.     Follow-up with Dr. Ramicone as scheduled.     Tete Griffin, APRN-CNP         [1]   Family " History  Problem Relation Name Age of Onset    Lung cancer Mother      Other (carotid blockage) Father      Hyperlipidemia Father

## 2025-06-16 ENCOUNTER — CLINICAL SUPPORT (OUTPATIENT)
Dept: CARDIOLOGY | Facility: CLINIC | Age: 65
End: 2025-06-16
Payer: COMMERCIAL

## 2025-06-16 DIAGNOSIS — I48.91 ATRIAL FIBRILLATION, UNSPECIFIED TYPE (MULTI): ICD-10-CM

## 2025-06-16 DIAGNOSIS — I48.0 PAROXYSMAL ATRIAL FIBRILLATION (MULTI): ICD-10-CM

## 2025-06-16 PROCEDURE — 93005 ELECTROCARDIOGRAM TRACING: CPT

## 2025-06-16 PROCEDURE — 93010 ELECTROCARDIOGRAM REPORT: CPT | Performed by: STUDENT IN AN ORGANIZED HEALTH CARE EDUCATION/TRAINING PROGRAM

## 2025-06-16 NOTE — PROGRESS NOTES
Pt arrives for EKG ordered by VENKAT Dickson for HR of 140s per patient's [employer] mobile. EKG reviewed by Yessi rodutter 111bpm. Reports palpitations. Patient taking Eliquis. Patient's bisoprolol increased today to 10mg daily for rate control. Per Yessi: Schedule cardioversion with Dr. Ramicone. Spoke to patient and made aware. Notified Cynthia - Cynthia to reach out to patient to schedule. Instructed patient to contact office with any questions/concerns.

## 2025-06-17 LAB
ATRIAL RATE: 394 BPM
P AXIS: 95 DEGREES
P OFFSET: 185 MS
P ONSET: 140 MS
Q ONSET: 220 MS
QRS COUNT: 18 BEATS
QRS DURATION: 88 MS
QT INTERVAL: 324 MS
QTC CALCULATION(BAZETT): 440 MS
QTC FREDERICIA: 397 MS
R AXIS: 79 DEGREES
T AXIS: 48 DEGREES
T OFFSET: 382 MS
VENTRICULAR RATE: 111 BPM

## 2025-06-17 RX ORDER — BISOPROLOL FUMARATE 5 MG/1
10 TABLET, FILM COATED ORAL DAILY
COMMUNITY
Start: 2025-06-16

## 2025-06-24 ENCOUNTER — TELEPHONE (OUTPATIENT)
Dept: CARDIOLOGY | Facility: CLINIC | Age: 65
End: 2025-06-24
Payer: COMMERCIAL

## 2025-06-24 NOTE — TELEPHONE ENCOUNTER
Left message for patient to call back. Need to schedule him for a cardioversion with Dr. Ramicone

## 2025-07-07 DIAGNOSIS — I48.91 ATRIAL FIBRILLATION, UNSPECIFIED TYPE (MULTI): ICD-10-CM

## 2025-07-07 RX ORDER — BISOPROLOL FUMARATE 5 MG/1
10 TABLET, FILM COATED ORAL DAILY
Qty: 180 TABLET | Refills: 3 | Status: SHIPPED | OUTPATIENT
Start: 2025-07-07 | End: 2026-07-07

## 2025-07-21 DIAGNOSIS — I48.91 ATRIAL FIBRILLATION, UNSPECIFIED TYPE (MULTI): ICD-10-CM

## 2025-07-21 RX ORDER — BISOPROLOL FUMARATE 5 MG/1
10 TABLET, FILM COATED ORAL DAILY
Qty: 180 TABLET | Refills: 3 | Status: SHIPPED | OUTPATIENT
Start: 2025-07-21 | End: 2026-07-21

## (undated) DEVICE — CABLE, ACHIEVE ADVANCE, 10 PIN, 196CM, GRAY

## (undated) DEVICE — CATHETER, ELECTRODE, STEERABLE, EP-XT, LG CURVE, 6FX125CM, REPROCESSED

## (undated) DEVICE — CLOSURE SYSTEM, VASCADE MVP XL, 10-12FR

## (undated) DEVICE — INTRODUCER, CATHETER, HEMOSTASIS, FAST-CATH, 12 FR X 12 CM

## (undated) DEVICE — CATHETER KIT, RADIAL ARTLINE, 20G X 1 3/4

## (undated) DEVICE — CATHETER, ARCTIC FRONT, 28MM

## (undated) DEVICE — CATHETER, ARCTIC FRONT ADVANCE PRO, 28MM

## (undated) DEVICE — CLOSURE SYSTEM, VASCULAR, MVP 6-12FR, VENOUS

## (undated) DEVICE — SHEATH, PINNACLE, W/.038 GUIDEWIRE, 10 CM,  6FR INTRODUCER, 6FR DIA, 2.5 CM DIALATOR

## (undated) DEVICE — CABLE, LIVEWIRE/ RESPONSE 20 PIN ENSITE X

## (undated) DEVICE — CATHETER, ELECTROPHYSIOLOGY, STEERABLE, DUO DEC, 20 ELECTRODE, 5 MM SPACING, SUPER LARGE CURL, 7 FR X 95 CM

## (undated) DEVICE — ELECTRODE KIT, ENSITE X EP SYSTEM SURFACE

## (undated) DEVICE — CATHETER, ACQCROSS, QX FLEXCATH, 65CM

## (undated) DEVICE — STEERABLE SHEATH, FLEXCATH, 12FR

## (undated) DEVICE — CATHETER, ADVISOR HD GRID X MAPPING, BI-DIRECTIONAL, SENSOR ENABLED

## (undated) DEVICE — CATHETER, ACHIEVE MAPPING, 20MM

## (undated) DEVICE — ELECTRODE, MULTIFUNCTION, QUICK-COMBO, EDGE SYSTEM, 2 FT

## (undated) DEVICE — PAD, GROUNDING, ELECTROSURGICAL, W/9 FT CABLE, POLYHESIVE II, ADULT, LF

## (undated) DEVICE — Device

## (undated) DEVICE — ACCESS KIT, MINI MAK, 4FR X 10CML, 0.018 X 40CM, SS/SS, ECHO ENHANCED 7CM NDL

## (undated) DEVICE — TUBING SET, COOL POINT, 2.6M, INDIVIDUAL

## (undated) DEVICE — CABLE, COAXIAL, UMBILICAL

## (undated) DEVICE — CATHETER, TACTIFLEX, BI-D ABLATION, 8FR 2-2-2MM D-F CURVE

## (undated) DEVICE — CABLE, ELECTRICAL, UMBILICAL

## (undated) DEVICE — CATHETER, VIEW FLEX XTRA ICE, 9FR (REPROCESSED)

## (undated) DEVICE — INTRODUCER, SHEATH, ENGAGE, 9FR 25CM 0.035

## (undated) DEVICE — CATHETER, ADVISOR HD GRID MAPPING, SENSOR ENABLED